# Patient Record
Sex: MALE | Race: WHITE | HISPANIC OR LATINO | Employment: FULL TIME | URBAN - METROPOLITAN AREA
[De-identification: names, ages, dates, MRNs, and addresses within clinical notes are randomized per-mention and may not be internally consistent; named-entity substitution may affect disease eponyms.]

---

## 2018-07-18 ENCOUNTER — APPOINTMENT (OUTPATIENT)
Dept: GENERAL RADIOLOGY | Facility: HOSPITAL | Age: 53
End: 2018-07-18

## 2018-07-18 PROCEDURE — 73610 X-RAY EXAM OF ANKLE: CPT | Performed by: GENERAL PRACTICE

## 2018-07-18 PROCEDURE — 73590 X-RAY EXAM OF LOWER LEG: CPT | Performed by: GENERAL PRACTICE

## 2021-06-02 ENCOUNTER — TELEPHONE (OUTPATIENT)
Dept: INTERNAL MEDICINE | Facility: CLINIC | Age: 56
End: 2021-06-02

## 2021-06-02 DIAGNOSIS — Z87.39 HISTORY OF GOUT: ICD-10-CM

## 2021-06-02 DIAGNOSIS — E55.9 VITAMIN D DEFICIENCY: ICD-10-CM

## 2021-06-02 DIAGNOSIS — Z51.81 THERAPEUTIC DRUG MONITORING: ICD-10-CM

## 2021-06-02 DIAGNOSIS — I10 BENIGN ESSENTIAL HYPERTENSION: Primary | ICD-10-CM

## 2021-06-02 DIAGNOSIS — E78.5 DYSLIPIDEMIA: ICD-10-CM

## 2021-06-02 DIAGNOSIS — Z00.00 ROUTINE PHYSICAL EXAMINATION: ICD-10-CM

## 2021-06-02 DIAGNOSIS — I10 BENIGN ESSENTIAL HYPERTENSION: ICD-10-CM

## 2021-06-04 ENCOUNTER — LAB (OUTPATIENT)
Dept: LAB | Facility: HOSPITAL | Age: 56
End: 2021-06-04

## 2021-06-04 LAB
25(OH)D3 SERPL-MCNC: 24.5 NG/ML (ref 30–100)
ALBUMIN SERPL-MCNC: 4.5 G/DL (ref 3.5–5.2)
ALBUMIN/GLOB SERPL: 1.7 G/DL
ALP SERPL-CCNC: 98 U/L (ref 39–117)
ALT SERPL W P-5'-P-CCNC: 26 U/L (ref 1–41)
ANION GAP SERPL CALCULATED.3IONS-SCNC: 11.2 MMOL/L (ref 5–15)
AST SERPL-CCNC: 28 U/L (ref 1–40)
BILIRUB SERPL-MCNC: 0.5 MG/DL (ref 0–1.2)
BILIRUB UR QL STRIP: NEGATIVE
BUN SERPL-MCNC: 17 MG/DL (ref 6–20)
BUN/CREAT SERPL: 21.8 (ref 7–25)
CALCIUM SPEC-SCNC: 9.5 MG/DL (ref 8.6–10.5)
CHLORIDE SERPL-SCNC: 104 MMOL/L (ref 98–107)
CLARITY UR: CLEAR
CO2 SERPL-SCNC: 24.8 MMOL/L (ref 22–29)
COLOR UR: YELLOW
CREAT SERPL-MCNC: 0.78 MG/DL (ref 0.76–1.27)
DEPRECATED RDW RBC AUTO: 49.1 FL (ref 37–54)
ERYTHROCYTE [DISTWIDTH] IN BLOOD BY AUTOMATED COUNT: 14.3 % (ref 12.3–15.4)
GFR SERPL CREATININE-BSD FRML MDRD: 102 ML/MIN/1.73
GLOBULIN UR ELPH-MCNC: 2.6 GM/DL
GLUCOSE SERPL-MCNC: 103 MG/DL (ref 65–99)
GLUCOSE UR STRIP-MCNC: NEGATIVE MG/DL
HCT VFR BLD AUTO: 43.9 % (ref 37.5–51)
HGB BLD-MCNC: 14.7 G/DL (ref 13–17.7)
HGB UR QL STRIP.AUTO: NEGATIVE
KETONES UR QL STRIP: NEGATIVE
LEUKOCYTE ESTERASE UR QL STRIP.AUTO: NEGATIVE
MCH RBC QN AUTO: 31.3 PG (ref 26.6–33)
MCHC RBC AUTO-ENTMCNC: 33.5 G/DL (ref 31.5–35.7)
MCV RBC AUTO: 93.4 FL (ref 79–97)
NITRITE UR QL STRIP: NEGATIVE
PH UR STRIP.AUTO: 5.5 [PH] (ref 5–8)
PLATELET # BLD AUTO: 273 10*3/MM3 (ref 140–450)
PMV BLD AUTO: 10.5 FL (ref 6–12)
POTASSIUM SERPL-SCNC: 4.6 MMOL/L (ref 3.5–5.2)
PROT SERPL-MCNC: 7.1 G/DL (ref 6–8.5)
PROT UR QL STRIP: NEGATIVE
PSA SERPL-MCNC: 2.98 NG/ML (ref 0–4)
RBC # BLD AUTO: 4.7 10*6/MM3 (ref 4.14–5.8)
SODIUM SERPL-SCNC: 140 MMOL/L (ref 136–145)
SP GR UR STRIP: 1.02 (ref 1–1.03)
T3FREE SERPL-MCNC: 3.03 PG/ML (ref 2–4.4)
T4 FREE SERPL-MCNC: 1.26 NG/DL (ref 0.93–1.7)
TSH SERPL DL<=0.05 MIU/L-ACNC: 3.99 UIU/ML (ref 0.27–4.2)
URATE SERPL-MCNC: 8.8 MG/DL (ref 3.4–7)
UROBILINOGEN UR QL STRIP: NORMAL
WBC # BLD AUTO: 7.36 10*3/MM3 (ref 3.4–10.8)

## 2021-06-04 PROCEDURE — 84550 ASSAY OF BLOOD/URIC ACID: CPT | Performed by: INTERNAL MEDICINE

## 2021-06-04 PROCEDURE — 81003 URINALYSIS AUTO W/O SCOPE: CPT | Performed by: INTERNAL MEDICINE

## 2021-06-04 PROCEDURE — 85027 COMPLETE CBC AUTOMATED: CPT | Performed by: INTERNAL MEDICINE

## 2021-06-04 PROCEDURE — 36415 COLL VENOUS BLD VENIPUNCTURE: CPT

## 2021-06-04 PROCEDURE — 84153 ASSAY OF PSA TOTAL: CPT | Performed by: INTERNAL MEDICINE

## 2021-06-04 PROCEDURE — 84481 FREE ASSAY (FT-3): CPT | Performed by: INTERNAL MEDICINE

## 2021-06-04 PROCEDURE — 84439 ASSAY OF FREE THYROXINE: CPT | Performed by: INTERNAL MEDICINE

## 2021-06-04 PROCEDURE — 82306 VITAMIN D 25 HYDROXY: CPT | Performed by: INTERNAL MEDICINE

## 2021-06-04 PROCEDURE — 83704 LIPOPROTEIN BLD QUAN PART: CPT | Performed by: INTERNAL MEDICINE

## 2021-06-04 PROCEDURE — 84443 ASSAY THYROID STIM HORMONE: CPT | Performed by: INTERNAL MEDICINE

## 2021-06-04 PROCEDURE — 80061 LIPID PANEL: CPT | Performed by: INTERNAL MEDICINE

## 2021-06-04 PROCEDURE — 80053 COMPREHEN METABOLIC PANEL: CPT | Performed by: INTERNAL MEDICINE

## 2021-06-06 LAB
CHOLEST SERPL-MCNC: 187 MG/DL (ref 100–199)
HDL SERPL-SCNC: 43.2 UMOL/L
HDLC SERPL-MCNC: 81 MG/DL
LDL SERPL QN: 21.2 NM
LDL SERPL-SCNC: 778 NMOL/L
LDL SMALL SERPL-SCNC: 194 NMOL/L
LDLC SERPL CALC-MCNC: 95 MG/DL (ref 0–99)
TRIGL SERPL-MCNC: 58 MG/DL (ref 0–149)

## 2021-06-14 ENCOUNTER — OFFICE VISIT (OUTPATIENT)
Dept: INTERNAL MEDICINE | Facility: CLINIC | Age: 56
End: 2021-06-14

## 2021-06-14 VITALS
TEMPERATURE: 98 F | OXYGEN SATURATION: 99 % | DIASTOLIC BLOOD PRESSURE: 82 MMHG | BODY MASS INDEX: 37.71 KG/M2 | HEIGHT: 70 IN | SYSTOLIC BLOOD PRESSURE: 140 MMHG | HEART RATE: 67 BPM | WEIGHT: 263.4 LBS | RESPIRATION RATE: 16 BRPM

## 2021-06-14 DIAGNOSIS — I10 BENIGN ESSENTIAL HYPERTENSION: Chronic | ICD-10-CM

## 2021-06-14 DIAGNOSIS — Z00.00 ROUTINE PHYSICAL EXAMINATION: Primary | ICD-10-CM

## 2021-06-14 DIAGNOSIS — E65 PANNUS, ABDOMINAL: Chronic | ICD-10-CM

## 2021-06-14 DIAGNOSIS — E66.01 MORBID OBESITY (HCC): ICD-10-CM

## 2021-06-14 DIAGNOSIS — Z86.010 HISTORY OF COLON POLYPS: ICD-10-CM

## 2021-06-14 DIAGNOSIS — Z51.81 THERAPEUTIC DRUG MONITORING: ICD-10-CM

## 2021-06-14 DIAGNOSIS — G47.33 OBSTRUCTIVE SLEEP APNEA: Chronic | ICD-10-CM

## 2021-06-14 DIAGNOSIS — E55.9 VITAMIN D DEFICIENCY: Chronic | ICD-10-CM

## 2021-06-14 DIAGNOSIS — Z87.39 HISTORY OF GOUT: Chronic | ICD-10-CM

## 2021-06-14 DIAGNOSIS — R49.0 CHRONIC HOARSENESS: Chronic | ICD-10-CM

## 2021-06-14 DIAGNOSIS — Z98.890 HISTORY OF GASTRIC SURGERY: Chronic | ICD-10-CM

## 2021-06-14 DIAGNOSIS — N62 GYNECOMASTIA, MALE: Chronic | ICD-10-CM

## 2021-06-14 DIAGNOSIS — K57.30 DIVERTICULOSIS OF COLON WITHOUT DIVERTICULITIS: ICD-10-CM

## 2021-06-14 DIAGNOSIS — Z11.59 NEED FOR HEPATITIS C SCREENING TEST: ICD-10-CM

## 2021-06-14 DIAGNOSIS — R73.01 IMPAIRED FASTING GLUCOSE: ICD-10-CM

## 2021-06-14 DIAGNOSIS — J38.1 VOCAL CORD POLYP: Chronic | ICD-10-CM

## 2021-06-14 PROBLEM — Z86.0100 HISTORY OF COLON POLYPS: Status: ACTIVE | Noted: 2021-06-14

## 2021-06-14 PROBLEM — Z86.0100 HISTORY OF COLON POLYPS: Chronic | Status: ACTIVE | Noted: 2021-06-14

## 2021-06-14 PROCEDURE — 99386 PREV VISIT NEW AGE 40-64: CPT | Performed by: INTERNAL MEDICINE

## 2021-06-14 RX ORDER — ALLOPURINOL 300 MG/1
TABLET ORAL
Qty: 90 TABLET | Refills: 3 | Status: SHIPPED | OUTPATIENT
Start: 2021-06-14 | End: 2022-01-17 | Stop reason: SDUPTHER

## 2021-06-14 NOTE — PROGRESS NOTES
06/14/2021    Patient Information  Reginald Mays                                                                                          [address] [facility] [address]      1965  [unfilled]  [phone] (work)    Chief Complaint:     Routine physical examination and follow-up blood work in order to monitor chronic medical issues listed in history of present illness.  Get reestablished.    History of Present Illness:    Patient with history of hypertension, chronic hoarseness and vocal cord polyps, history of Vitaliy-en-Y bariatric surgery, history of gout, male gynecomastia, morbid obesity, sleep apnea, vitamin D deficiency.  He presents today to get reestablished with me and to have his physical.  I have not seen this patient in greater than 3 years.  His past medical history reviewed and updated were necessary including health maintenance parameters.  This reveals he needs hepatitis C screening and also Shingrix vaccine.  I encouraged him to check with his insurance company regarding the Shingrix vaccine and if it is covered benefit here in the office he can come back and obtain 1.    Review of Systems   Constitutional: Negative.   HENT: Negative.    Eyes: Negative.    Cardiovascular: Negative.    Respiratory: Negative.    Endocrine: Negative.    Hematologic/Lymphatic: Negative.    Skin: Negative.    Musculoskeletal: Negative.    Gastrointestinal: Negative.    Genitourinary: Negative.    Neurological: Negative.    Psychiatric/Behavioral: Negative.    Allergic/Immunologic: Negative.        Active Problems:    Patient Active Problem List   Diagnosis   • Benign essential hypertension   • History of gastric surgery for morbid obesity bastric bypass   • History of gout   • Gynecomastia, male   • Morbid obesity (CMS/HCC)   • Obstructive sleep apnea   • Vitamin D deficiency   • Routine physical examination   • Therapeutic drug monitoring   • Impaired fasting glucose   • History of colon  polyps, 12/17/2015--tubular adenoma x1   • Diverticulosis of colon         Past Medical History:   Diagnosis Date   • Benign essential hypertension 6/19/2016 07/31/2015--patient reestablished as a patient. He has lost a tremendous amount of weight and his hypertension has resolved.   01/11/2005--initial diagnosis and treatment of hypertension. Patient has been poorly compliant.   • Chronic hoarseness 6/19/2016 07/31/2015--patient presents with a 4-5 month history of chronic hoarseness. This gets particularly worse at the end of the day to the point that he can barely talk. He has not noticed any swollen lymph nodes or masses in his neck. Suspected vocal cord polyps. ENT referral.   • Diverticulosis of colon 6/14/2021 December 17, 2015--colonoscopy revealed diverticulosis in the rectosigmoid, sigmoid, and descending colon.  There was one 5 mm polyp in the transverse colon which was removed.  Pathology returned tubular adenoma.   • Gynecomastia, male 6/19/2016 07/31/2015--patient has lost a tremendous amount of weight but has residual excess skin and breast tissue that he would like to have surgically corrected. Patient referred to plastic surgery.   • History of bacterial pneumonia 6/19/2016 08/18/2006--patient presented with chest congestion, cough, fever. Chest x-ray revealed consolidation in the right lower lobe without pleural effusion. Patient was treated with Augmentin and I added a Z-Johnson which resulted in resolution.   • History of colon polyps, 12/17/2015--tubular adenoma x1 6/14/2021 December 17, 2015--colonoscopy revealed diverticulosis in the rectosigmoid, sigmoid, and descending colon.  There was one 5 mm polyp in the transverse colon which was removed.  Pathology returned tubular adenoma.   • History of Fracture of right cuboid bone 6/19/2016 12/24/2002--patient presented to the emergency room with right foot pain and swelling. X-ray revealed a nondisplaced cuboid fracture which was  treated conservatively.   • History of gastric surgery for morbid obesity bastric bypass 6/19/2016 01/26/2005--laparoscopic Vitaliy-en-Y divided gastric bypass.   • History of gout 6/19/2016 03/02/2005--initial diagnosis and treatment of acute gouty arthritis.   • History of Lumbar burst fracture 6/19/2016 11/21/2001--patient fell off a ladder while at work and landed on his buttocks and bilateral heels. He suffered an L1 burst fracture and a right wrist fracture. There was very minimal cord impingement. Treated conservatively with an oyster shell brace. Subsequent follow-up x-ray revealed healed fracture.   • History of Periorbital cellulitis 6/19/2016 07/05/2006--patient presented with right periorbital redness and swelling and chalazion. This revealed preseptal cellulitis with no evidence of abscess. Patient admitted the hospital and responded to IV antibiotics but also required IV steroids due to severe periocular inflammation. He responded well to this. He was reevaluated by the ophthalmologist 07/14/2006 and his vision was 20/20 and his lid   • History of tetanus, diphtheria, and acellular pertussis booster vaccination (Tdap) 6/19/2016 05/29/2012--TDaP given.   • Impaired fasting glucose 6/14/2021    10/14/2021--fasting serum glucose 103.  Strongly recommend low carbohydrate diet, exercise, and weight loss.   • Morbid obesity (CMS/HCC) 6/19/2016 01/18/2005--patient was evaluated for minimally invasive bariatric surgery and this was subsequently performed. I do not have the details at the present time.   • Obstructive sleep apnea 6/19/2016 07/31/2015--patient reestablished. He has lost a significant amount of weight and has not used CPAP for years. He reports he sleeps well and has no hypersomnolence. Overnight oximetry ordered.   12/10/2002--split night sleep study with diagnostic CPAP titration. Total respiratory disturbance index 54.0 per sleep hour with oxygen desaturations to 67%.  Improvement on CPAP at a pressure of 13, with a   • Pannus, abdominal 6/19/2016 07/31/2015--patient is also tremendous amount of weight and is left with quite a large abdominal pannus that he would like to have surgically corrected. Patient referred to plastic surgery.   • Vitamin D deficiency 6/2/2021   • Vocal cord polyp 6/19/2016 08/11/2015--patient underwent a microlaryngoscopy with excision of vocal cord polyp. This was performed for chronic hoarseness. Pathology returned hyperplasia of squamous epithelium. No atypia detected.         Past Surgical History:   Procedure Laterality Date   • COLONOSCOPY  02/12/2002 02/12/2002--colonoscopy performed for bright red blood per rectum was a normal colonoscopy to the cecum with a good prep.   • LARYNGOSCOPY  08/11/2015 08/11/2015--patient underwent a microlaryngoscopy with excision of vocal cord polyp. This was performed for chronic hoarseness. Pathology returned hyperplasia of squamous epithelium. No atypia detected.   • VITALIY-EN-Y PROCEDURE  01/26/2005 01/26/2005--laparoscopic Vitaliy-en-Y divided gastric bypass.   • TONSILLECTOMY      7 years of age.         No Known Allergies        Current Outpatient Medications:   •  allopurinol (Zyloprim) 300 MG tablet, Take 1 p.o. daily to prevent gout, Disp: 90 tablet, Rfl: 3  •  vitamin D3 125 MCG (5000 UT) capsule capsule, 1 by mouth daily as directed for low vitamin D, Disp: 30 capsule, Rfl:       Family History   Problem Relation Age of Onset   • Hypertension Mother         Essential   • Atrial fibrillation Father    • Hypertension Father         Essential   • Gout Father    • Hyperlipidemia Father    • Diabetes Father         Type 2         Social History     Socioeconomic History   • Marital status:      Spouse name: Not on file   • Number of children: Not on file   • Years of education: Not on file   • Highest education level: Not on file   Tobacco Use   • Smoking status: Former Smoker     Quit date:  "     Years since quittin.4   • Smokeless tobacco: Never Used   Vaping Use   • Vaping Use: Never used   Substance and Sexual Activity   • Alcohol use: Yes     Comment: Occasionally   • Sexual activity: Yes     Partners: Female         Vitals:    21 1333   BP: 140/82   Pulse: 67   Resp: 16   Temp: 98 °F (36.7 °C)   SpO2: 99%   Weight: 119 kg (263 lb 6.4 oz)   Height: 177.8 cm (70\")        Body mass index is 37.79 kg/m².      Physical Exam:    General: Alert and oriented x 3.  No acute distress.  Obese.  Normal affect.  HEENT: Pupils equal, round, reactive to light; extraocular movements intact; sclerae nonicteric; pharynx, ear canals and TMs normal.  Neck: Without JVD, thyromegaly, bruit, or adenopathy.  Lungs: Clear to auscultation in all fields.  Heart: Regular rate and rhythm without murmur, rub, gallop, or click.  Abdomen: Soft, nontender, without hepatosplenomegaly or hernia.  Bowel sounds normal.  : Deferred.  Rectal: Deferred.  Extremities: Without clubbing, cyanosis, edema, or pulse deficit.  Neurologic: Intact without focal deficit.  Normal station and gait observed during ingress and egress from the examination room.  Skin: Without significant lesion.  Musculoskeletal: Unremarkable.    Lab/other results:    NMR is absolutely perfect.  CMP normal except glucose 103.  CBC is normal.  Urinalysis normal.  Thyroid function test normal.  PSA normal at 2.98.  Uric acid elevated at 8.8.  Vitamin D is low at 24.5.    Assessment/Plan:     Diagnosis Plan   1. Routine physical examination     2. Benign essential hypertension     3. History of gout  Uric Acid    allopurinol (Zyloprim) 300 MG tablet   4. Morbid obesity (CMS/HCC)     5. Obstructive sleep apnea  Home Sleep Study   6. Vitamin D deficiency  Vitamin D 25 Hydroxy    vitamin D3 125 MCG (5000 UT) capsule capsule   7. Vocal cord polyp     8. Chronic hoarseness     9. History of gastric surgery for morbid obesity bastric bypass     10. " Gynecomastia, male     11. Pannus, abdominal     12. Therapeutic drug monitoring     13. Impaired fasting glucose  Comprehensive Metabolic Panel    Hemoglobin A1c   14. History of colon polyps, 12/17/2015--tubular adenoma x1  Ambulatory Referral For Screening Colonoscopy   15. Diverticulosis of colon     16. Need for hepatitis C screening test  Hepatitis C Antibody     Patient presents with essentially normal annual physical except with the following issues: He has hypertension history that needs to be monitored closely and possibly treated.  It is borderline today.  He has a history of gout and his uric acid levels are quite high and I feel that he should be on allopurinol.  He still suffers from obesity and I have strongly encouraged him to lose weight.  Patient has sleep apnea which apparently improved with weight loss.  He has not been on CPAP for years.  He has extremely low vitamin D and needs to be on vitamin D supplementation.  His chronic hoarseness due to vocal cord polyp has been evaluated by ENT.  I have encouraged him to keep a follow-up.  Gynecomastia seems stable.    Plan is as follows: Start allopurinol and titrate to 300 mg/day.  Start vitamin D 5000 units/day.  Strongly recommend low carbohydrate diet, exercise, and weight loss.  I made patient aware he is at risk for developing overt diabetes.  Home sleep study ordered.  I will have him follow-up in 6 months with lab prior to reassess the situation.  Otherwise he will follow-up as needed.    Addendum: Patient had a colonoscopy back in 2016 which I was not aware of.  I was able to obtain the documentation.  This is documented under history of colon polyps.  Patient will need a repeat colonoscopy.    Procedures

## 2021-07-28 ENCOUNTER — PREP FOR SURGERY (OUTPATIENT)
Dept: OTHER | Facility: HOSPITAL | Age: 56
End: 2021-07-28

## 2021-07-28 DIAGNOSIS — Z86.010 HISTORY OF ADENOMATOUS POLYP OF COLON: Primary | ICD-10-CM

## 2021-08-02 ENCOUNTER — OFFICE VISIT (OUTPATIENT)
Dept: SLEEP MEDICINE | Facility: HOSPITAL | Age: 56
End: 2021-08-02

## 2021-08-02 VITALS
HEART RATE: 66 BPM | BODY MASS INDEX: 37.08 KG/M2 | HEIGHT: 70 IN | SYSTOLIC BLOOD PRESSURE: 136 MMHG | OXYGEN SATURATION: 98 % | DIASTOLIC BLOOD PRESSURE: 80 MMHG | WEIGHT: 259 LBS

## 2021-08-02 DIAGNOSIS — E66.9 OBESITY (BMI 30-39.9): ICD-10-CM

## 2021-08-02 DIAGNOSIS — G47.33 OBSTRUCTIVE SLEEP APNEA: Primary | Chronic | ICD-10-CM

## 2021-08-02 PROCEDURE — G0463 HOSPITAL OUTPT CLINIC VISIT: HCPCS

## 2021-08-02 NOTE — PROGRESS NOTES
[facility]  Telephone: [phone] / Fax: [phone] [place]  Telephone: [phone] / Fax: [phone] [place]    Referring Physician: Reginald Dee MD  PCP: Reginald Dee MD    Reason for consult:  sleep apnea    Reginald Mays is a 56 y.o.male  was [facility] in the [facility] today for evaluation of sleep apnea. Recent physical with PCP recommended ERWIN evaluation. Wife reports he snores at night. He broke his nose 5 times and is a mouth breather. He has chronic pain that interferes wih sleep. He wakes up every 45 minutes to change positions. He does not sleep much in supine. He gained 40 lbs in the past 5 years. Wife reports instances of observed apneas, but they do not occur often. He wakes up rested in morning. He does not take naps. He was tested for ERWIN 15 years ago. ERWIN was confirmed. He was given a CPAP machine and used it for a brief period, but then lost a lot of weight and stopped CPAP. He re-gained his weight since. He has no history of stroke/MI.     SH- runs a company,  Former smoker age [age], 3 coffee per day, no drugs.    ROS- +frequent urination      Reginald Mays  has a past medical history of Benign essential hypertension (6/19/2016), Chronic hoarseness (6/19/2016), Diverticulosis of colon (6/14/2021), Gynecomastia, male (6/19/2016), History of bacterial pneumonia (6/19/2016), History of colon polyps, 12/17/2015--tubular adenoma x1 (6/14/2021), History of Fracture of right cuboid bone (6/19/2016), History of gastric surgery for morbid obesity bastric bypass (6/19/2016), History of gout (6/19/2016), History of Lumbar burst fracture (6/19/2016), History of Periorbital cellulitis (6/19/2016), History of tetanus, diphtheria, and acellular pertussis booster vaccination (Tdap) (6/19/2016), Impaired fasting glucose (6/14/2021), Morbid obesity (CMS/HCC) (6/19/2016), Obstructive sleep apnea (6/19/2016), Pannus, abdominal (6/19/2016), Vitamin D  "deficiency (6/2/2021), and Vocal cord polyp (6/19/2016).    Current Medications:    Current Outpatient Medications:   •  allopurinol (Zyloprim) 300 MG tablet, Take 1 p.o. daily to prevent gout, Disp: 90 tablet, Rfl: 3  •  vitamin D3 125 MCG (5000 UT) capsule capsule, 1 by mouth daily as directed for low vitamin D, Disp: 30 capsule, Rfl:     I have reviewed Past Medical History, Past Surgical History, Medication List, Social History and Family History as entered in Sleep Questionnaire and EPIC.    ESS  5   Vital Signs /80   Pulse 66   Ht 177.8 cm (70\")   Wt 117 kg (259 lb)   SpO2 98%   BMI 37.16 kg/m²  Body mass index is 37.16 kg/m².    General Alert and oriented. No acute distress noted   Pharynx/Throat Class III  Mallampati airway, large tongue, no evidence of redundant lateral pharyngeal tissue. No oral lesions. No thrush. Moist mucous membranes.   Head Normocephalic. Symmetrical. Atraumatic.    Nose No septal deviation. No drainage   Chest Wall Normal shape. Symmetric expansion with respiration. No tenderness.   Neck Trachea midline, no thyromegaly or adenopathy    Lungs Clear to auscultation bilaterally. No wheezes. No rhonchi. No rales. Respirations regular, even and unlabored.   Heart Regular rhythm and normal rate. Normal S1 and S2. No murmur   Abdomen Soft, non-tender and non-distended. Normal bowel sounds. No masses.   Extremities Moves all extremities well. No edema   Psychiatric Normal mood and affect.        Impression:  1. Obstructive sleep apnea    2. Obesity (BMI 30-39.9)          Plan:  I discussed the pathophysiology of obstructive sleep apnea with the patient.  We discussed the adverse outcomes associated with untreated sleep-disordered breathing. We discussed CPAP, OMAD, and inspire treatment options. I showed him several masks. He is a mouth breather due to traumatic nose injuries, and will most likely require a full face mask. Wife and pt liked the [employer] FFM.     We discussed " treatment modalities of obstructive sleep apnea including CPAP device. Sleep study will be scheduled to establish a definitive diagnosis of sleep disorder breathing.  Weight loss will be strongly beneficial in order to reduce the severity of sleep-disordered breathing.  Patient has narrow oropharyngeal structure.  Caution during activities that require prolonged concentration is strongly advised.  After sleep study results are available, patient will be notified, and appointment will be scheduled to discuss sleep study results and treatment recommendations.        I appreciate the opportunity to participate in this patient's care.      CALLIE Loo  [facility]  Phone: [phone]      Part of this note may be an electronic transcription/translation of spoken language to printed text using the Dragon Dictation System. Some errors may exist even though the document was edited.

## 2021-08-17 ENCOUNTER — HOSPITAL ENCOUNTER (OUTPATIENT)
Dept: SLEEP MEDICINE | Facility: HOSPITAL | Age: 56
Discharge: HOME | End: 2021-08-17
Admitting: NURSE PRACTITIONER

## 2021-08-17 DIAGNOSIS — G47.33 OBSTRUCTIVE SLEEP APNEA: ICD-10-CM

## 2021-08-17 PROCEDURE — 95806 SLEEP STUDY UNATT&RESP EFFT: CPT

## 2021-08-24 ENCOUNTER — HOSPITAL ENCOUNTER (OUTPATIENT)
Facility: HOSPITAL | Age: 56
Setting detail: HOSPITAL OUTPATIENT SURGERY
Discharge: HOME | End: 2021-08-24
Attending: SURGERY | Admitting: SURGERY

## 2021-08-24 ENCOUNTER — ANESTHESIA (OUTPATIENT)
Dept: GASTROENTEROLOGY | Facility: HOSPITAL | Age: 56
End: 2021-08-24

## 2021-08-24 ENCOUNTER — ANESTHESIA EVENT (OUTPATIENT)
Dept: GASTROENTEROLOGY | Facility: HOSPITAL | Age: 56
End: 2021-08-24

## 2021-08-24 VITALS
DIASTOLIC BLOOD PRESSURE: 99 MMHG | SYSTOLIC BLOOD PRESSURE: 131 MMHG | HEART RATE: 66 BPM | WEIGHT: 241 LBS | OXYGEN SATURATION: 97 % | RESPIRATION RATE: 20 BRPM | HEIGHT: 70 IN | BODY MASS INDEX: 34.5 KG/M2

## 2021-08-24 DIAGNOSIS — G47.33 OBSTRUCTIVE SLEEP APNEA: Primary | ICD-10-CM

## 2021-08-24 DIAGNOSIS — Z91.14 POOR COMPLIANCE WITH CPAP TREATMENT: ICD-10-CM

## 2021-08-24 PROBLEM — K64.8 INTERNAL HEMORRHOIDS: Status: ACTIVE | Noted: 2021-08-24

## 2021-08-24 PROBLEM — K57.90 DIVERTICULOSIS: Status: ACTIVE | Noted: 2021-08-24

## 2021-08-24 PROCEDURE — 45378 DIAGNOSTIC COLONOSCOPY: CPT | Performed by: SURGERY

## 2021-08-24 PROCEDURE — S0260 H&P FOR SURGERY: HCPCS | Performed by: SURGERY

## 2021-08-24 PROCEDURE — 25010000002 PROPOFOL 10 MG/ML EMULSION: Performed by: NURSE ANESTHETIST, CERTIFIED REGISTERED

## 2021-08-24 RX ORDER — PROPOFOL 10 MG/ML
VIAL (ML) INTRAVENOUS AS NEEDED
Status: DISCONTINUED | OUTPATIENT
Start: 2021-08-24 | End: 2021-08-24 | Stop reason: SURG

## 2021-08-24 RX ORDER — SODIUM CHLORIDE, SODIUM LACTATE, POTASSIUM CHLORIDE, CALCIUM CHLORIDE 600; 310; 30; 20 MG/100ML; MG/100ML; MG/100ML; MG/100ML
INJECTION, SOLUTION INTRAVENOUS CONTINUOUS PRN
Status: DISCONTINUED | OUTPATIENT
Start: 2021-08-24 | End: 2021-08-24 | Stop reason: SURG

## 2021-08-24 RX ORDER — SODIUM CHLORIDE, SODIUM LACTATE, POTASSIUM CHLORIDE, CALCIUM CHLORIDE 600; 310; 30; 20 MG/100ML; MG/100ML; MG/100ML; MG/100ML
1000 INJECTION, SOLUTION INTRAVENOUS CONTINUOUS
Status: DISCONTINUED | OUTPATIENT
Start: 2021-08-24 | End: 2021-08-24 | Stop reason: HOSPADM

## 2021-08-24 RX ORDER — PROPOFOL 10 MG/ML
VIAL (ML) INTRAVENOUS CONTINUOUS PRN
Status: DISCONTINUED | OUTPATIENT
Start: 2021-08-24 | End: 2021-08-24 | Stop reason: SURG

## 2021-08-24 RX ORDER — ZOLPIDEM TARTRATE 5 MG/1
TABLET ORAL
Qty: 2 TABLET | Refills: 0 | Status: SHIPPED | OUTPATIENT
Start: 2021-08-24 | End: 2022-03-10

## 2021-08-24 RX ADMIN — PROPOFOL 120 MCG/KG/MIN: 10 INJECTION, EMULSION INTRAVENOUS at 09:31

## 2021-08-24 RX ADMIN — Medication 100 MG: at 09:31

## 2021-08-24 RX ADMIN — Medication 100 MG: at 09:43

## 2021-08-24 RX ADMIN — SODIUM CHLORIDE, POTASSIUM CHLORIDE, SODIUM LACTATE AND CALCIUM CHLORIDE: 600; 310; 30; 20 INJECTION, SOLUTION INTRAVENOUS at 09:30

## 2021-08-24 RX ADMIN — SODIUM CHLORIDE, POTASSIUM CHLORIDE, SODIUM LACTATE AND CALCIUM CHLORIDE 1000 ML: 600; 310; 30; 20 INJECTION, SOLUTION INTRAVENOUS at 09:25

## 2021-08-24 NOTE — H&P
Reason for Visit: Surveillance colonoscopy    HPI:  Patient presents for evaluation for colon cancer surveillance.  There is no family history of colon neoplasia. No family hx of gastric, ovarian, or uterine cancer.  Patient denies changes in bowel habits, diarrhea, constipation, abdominal pain, decreased caliber of stool, melena, brbpr and weight loss.  There is no personal or family history of bleeding disorder or untoward reaction to anesthesia.  Patient has had a colonoscopy 6 year(s) ago.      Past Medical History:   Diagnosis Date   • Benign essential hypertension 6/19/2016 07/31/2015--patient reestablished as a patient. He has lost a tremendous amount of weight and his hypertension has resolved.   01/11/2005--initial diagnosis and treatment of hypertension. Patient has been poorly compliant.   • Chronic hoarseness 6/19/2016 07/31/2015--patient presents with a 4-5 month history of chronic hoarseness. This gets particularly worse at the end of the day to the point that he can barely talk. He has not noticed any swollen lymph nodes or masses in his neck. Suspected vocal cord polyps. ENT referral.   • Diverticulosis of colon 6/14/2021 December 17, 2015--colonoscopy revealed diverticulosis in the rectosigmoid, sigmoid, and descending colon.  There was one 5 mm polyp in the transverse colon which was removed.  Pathology returned tubular adenoma.   • Gynecomastia, male 6/19/2016 07/31/2015--patient has lost a tremendous amount of weight but has residual excess skin and breast tissue that he would like to have surgically corrected. Patient referred to plastic surgery.   • History of bacterial pneumonia 6/19/2016 08/18/2006--patient presented with chest congestion, cough, fever. Chest x-ray revealed consolidation in the right lower lobe without pleural effusion. Patient was treated with Augmentin and I added a Z-Johnson which resulted in resolution.   • History of colon polyps, 12/17/2015--tubular adenoma  x1 6/14/2021 December 17, 2015--colonoscopy revealed diverticulosis in the rectosigmoid, sigmoid, and descending colon.  There was one 5 mm polyp in the transverse colon which was removed.  Pathology returned tubular adenoma.   • History of Fracture of right cuboid bone 6/19/2016 12/24/2002--patient presented to the emergency room with right foot pain and swelling. X-ray revealed a nondisplaced cuboid fracture which was treated conservatively.   • History of gastric surgery for morbid obesity bastric bypass 6/19/2016 01/26/2005--laparoscopic Vitaliy-en-Y divided gastric bypass.   • History of gout 6/19/2016 03/02/2005--initial diagnosis and treatment of acute gouty arthritis.   • History of Lumbar burst fracture 6/19/2016 11/21/2001--patient fell off a ladder while at work and landed on his buttocks and bilateral heels. He suffered an L1 burst fracture and a right wrist fracture. There was very minimal cord impingement. Treated conservatively with an oyster shell brace. Subsequent follow-up x-ray revealed healed fracture.   • History of Periorbital cellulitis 6/19/2016 07/05/2006--patient presented with right periorbital redness and swelling and chalazion. This revealed preseptal cellulitis with no evidence of abscess. Patient admitted the hospital and responded to IV antibiotics but also required IV steroids due to severe periocular inflammation. He responded well to this. He was reevaluated by the ophthalmologist 07/14/2006 and his vision was 20/20 and his lid   • History of tetanus, diphtheria, and acellular pertussis booster vaccination (Tdap) 6/19/2016 05/29/2012--TDaP given.   • Impaired fasting glucose 6/14/2021    10/14/2021--fasting serum glucose 103.  Strongly recommend low carbohydrate diet, exercise, and weight loss.   • Morbid obesity (CMS/HCC) 6/19/2016 01/18/2005--patient was evaluated for minimally invasive bariatric surgery and this was subsequently performed. I do not have the  details at the present time.   • Obstructive sleep apnea 6/19/2016 07/31/2015--patient reestablished. He has lost a significant amount of weight and has not used CPAP for years. He reports he sleeps well and has no hypersomnolence. Overnight oximetry ordered.   12/10/2002--split night sleep study with diagnostic CPAP titration. Total respiratory disturbance index 54.0 per sleep hour with oxygen desaturations to 67%. Improvement on CPAP at a pressure of 13, with a   • Pannus, abdominal 6/19/2016 07/31/2015--patient is also tremendous amount of weight and is left with quite a large abdominal pannus that he would like to have surgically corrected. Patient referred to plastic surgery.   • Vitamin D deficiency 6/2/2021   • Vocal cord polyp 6/19/2016 08/11/2015--patient underwent a microlaryngoscopy with excision of vocal cord polyp. This was performed for chronic hoarseness. Pathology returned hyperplasia of squamous epithelium. No atypia detected.       Past Surgical History:   Procedure Laterality Date   • COLONOSCOPY  02/12/2002 02/12/2002--colonoscopy performed for bright red blood per rectum was a normal colonoscopy to the cecum with a good prep.   • COLONOSCOPY W/ POLYPECTOMY  12/17/2015 December 17, 2015--colonoscopy revealed diverticulosis in the rectosigmoid, sigmoid, and descending colon.  There was one 5 mm polyp in the transverse colon which was removed.  Pathology returned tubular adenoma.   • LARYNGOSCOPY  08/11/2015 08/11/2015--patient underwent a microlaryngoscopy with excision of vocal cord polyp. This was performed for chronic hoarseness. Pathology returned hyperplasia of squamous epithelium. No atypia detected.   • VITALIY-EN-Y PROCEDURE  01/26/2005 01/26/2005--laparoscopic Vitaliy-en-Y divided gastric bypass.   • TONSILLECTOMY      7 years of age.       No current facility-administered medications for this encounter.    No Known Allergies    Family History   Problem Relation Age of  Onset   • Hypertension Mother         Essential   • Atrial fibrillation Father    • Hypertension Father         Essential   • Gout Father    • Hyperlipidemia Father    • Diabetes Father         Type 2       Social History     Socioeconomic History   • Marital status:      Spouse name: Not on file   • Number of children: Not on file   • Years of education: Not on file   • Highest education level: Not on file   Tobacco Use   • Smoking status: Former Smoker     Quit date:      Years since quittin.6   • Smokeless tobacco: Never Used   Vaping Use   • Vaping Use: Never used   Substance and Sexual Activity   • Alcohol use: Yes     Comment: Occasionally   • Sexual activity: Yes     Partners: Female        Review Of Systems:  A comprehensive review of systems was negative.    Objective:   Physical exam:  There were no vitals taken for this visit.    General Appearance:  awake, alert, oriented, in no acute distress and well developed, well nourished  Lungs:  Normal expansion.  Clear to auscultation.  No rales, rhonchi, or wheezing.  Heart:  Heart sounds are normal.  Regular rate and rhythm without murmur, gallop or rub.  Abdomen:  Soft, non-tender, normal bowel sounds; no bruits, organomegaly or masses.    Assessment:    Reginald Mays is a 56 y.o. male who presents for evaluation for colon cancer surveillance.    Patient has increased risk factors or warning signs or symptoms.  I reviewed current ACG guidelines for colon cancer screening:    A)  Flex sig q 5yrs with yearly fecal occult blood testing  B)  Virtual colonoscopy  C)  Colonoscopy with possible biopsy/polypectomy with IV sedation at appropriate       screening intervals    The patient has no family history of colon cancer.  He  has a personal history of colon polyp who presents for colon cancer surveillance evaluation.   I would advise a colonscopy.     The rationale for each option as well as all risks and benefits of each alternative were  discussed with the patient in detail.  The patient understands and does wish to proceed with Colonoscopy, Diagnostic        The rationale for colonoscopy with possible biopsy, possible polypectomy, with IV sedation as well as all risks, benefits, and alternatives were discussed with the patient in detail. Risks including but not limited to perforation, bleeding,need for blood transfusion or emergent surgery ,and missed neoplasm were reviewed in detail with the patient The patient demonstrates full understanding and wishes to proceed with the colonoscopy.

## 2021-08-24 NOTE — DISCHARGE INSTRUCTIONS
For the next 24 hours patient needs to be with a responsible adult.    For 24 hours DO NOT drive, operate machinery, appliances, drink alcohol, make important decisions or sign legal documents.    Start with a light or bland diet and advance to regular diet as tolerated.    Follow recommendations on procedure report provided by your doctor.    Call Dr Freeman for problems [phone]    Problems may include but not limited to: large amounts of bleeding, trouble breathing, repeated vomiting, severe unrelieved pain, fever or chills.

## 2021-08-24 NOTE — ANESTHESIA POSTPROCEDURE EVALUATION
"Patient: Reginald Mays    Procedure Summary     Date: 08/24/21 Room / Location: [facility] ENDOSCOPY 4 /  [facility]    Anesthesia Start: 0926 Anesthesia Stop: 0957    Procedure: COLONOSCOPY TO CECUM (N/A ) Diagnosis:       History of adenomatous polyp of colon      (History of adenomatous polyp of colon [Z86.010])    Surgeons: Lei Freeman MD Provider: Josue Lieberman MD    Anesthesia Type: MAC ASA Status: 3          Anesthesia Type: No value filed.    Vitals  Vitals Value Taken Time   /99 08/24/21 1015   Temp     Pulse 66 08/24/21 1015   Resp 20 08/24/21 1015   SpO2 97 % 08/24/21 1015           Post Anesthesia Care and Evaluation    Patient location during evaluation: PACU  Patient participation: complete - patient participated  Level of consciousness: awake  Pain score: 0  Pain management: adequate  Airway patency: patent  Anesthetic complications: No anesthetic complications  PONV Status: none  Cardiovascular status: acceptable  Respiratory status: acceptable  Hydration status: acceptable    Comments: /99 (BP Location: Left arm, Patient Position: Sitting)   Pulse 66   Resp 20   Ht 177.8 cm (70\")   Wt 109 kg (241 lb)   SpO2 97%   BMI 34.58 kg/m²       "

## 2021-08-24 NOTE — ANESTHESIA PREPROCEDURE EVALUATION
Anesthesia Evaluation     Patient summary reviewed and Nursing notes reviewed                Airway   Mallampati: I  TM distance: >3 FB  Neck ROM: full  No difficulty expected  Dental - normal exam     Pulmonary - normal exam   (+) a smoker Former, sleep apnea,   Cardiovascular - normal exam    (+) hypertension,       Neuro/Psych- negative ROS  GI/Hepatic/Renal/Endo    (+) obesity, morbid obesity,      Musculoskeletal (-) negative ROS    Abdominal  - normal exam    Bowel sounds: normal.   Substance History - negative use     OB/GYN negative ob/gyn ROS         Other                      Anesthesia Plan    ASA 3     MAC       Anesthetic plan, all risks, benefits, and alternatives have been provided, discussed and informed consent has been obtained with: patient.

## 2021-08-30 ENCOUNTER — TELEPHONE (OUTPATIENT)
Dept: SLEEP MEDICINE | Facility: HOSPITAL | Age: 56
End: 2021-08-30

## 2021-08-30 NOTE — TELEPHONE ENCOUNTER
LV informing patient of sleep study results and doctors recommendations for a titration. He is to call back if he has questions about sleep study results or would like to schedule that titration

## 2021-09-01 ENCOUNTER — TELEPHONE (OUTPATIENT)
Dept: SLEEP MEDICINE | Facility: HOSPITAL | Age: 56
End: 2021-09-01

## 2021-10-04 ENCOUNTER — TRANSCRIBE ORDERS (OUTPATIENT)
Dept: SLEEP MEDICINE | Facility: HOSPITAL | Age: 56
End: 2021-10-04

## 2021-10-04 DIAGNOSIS — Z01.818 OTHER SPECIFIED PRE-OPERATIVE EXAMINATION: Primary | ICD-10-CM

## 2021-10-08 ENCOUNTER — LAB (OUTPATIENT)
Dept: LAB | Facility: HOSPITAL | Age: 56
End: 2021-10-08

## 2021-10-08 LAB — SARS-COV-2 ORF1AB RESP QL NAA+PROBE: NOT DETECTED

## 2021-10-08 PROCEDURE — C9803 HOPD COVID-19 SPEC COLLECT: HCPCS | Performed by: INTERNAL MEDICINE

## 2021-10-08 PROCEDURE — U0004 COV-19 TEST NON-CDC HGH THRU: HCPCS | Performed by: INTERNAL MEDICINE

## 2021-10-11 ENCOUNTER — HOSPITAL ENCOUNTER (OUTPATIENT)
Dept: SLEEP MEDICINE | Facility: HOSPITAL | Age: 56
Discharge: HOME | End: 2021-10-11
Admitting: INTERNAL MEDICINE

## 2021-10-11 DIAGNOSIS — G47.33 OBSTRUCTIVE SLEEP APNEA: ICD-10-CM

## 2021-10-11 DIAGNOSIS — Z91.14 POOR COMPLIANCE WITH CPAP TREATMENT: ICD-10-CM

## 2021-10-11 PROCEDURE — 95811 POLYSOM 6/>YRS CPAP 4/> PARM: CPT

## 2021-10-25 ENCOUNTER — TELEPHONE (OUTPATIENT)
Dept: SLEEP MEDICINE | Facility: HOSPITAL | Age: 56
End: 2021-10-25

## 2021-10-25 NOTE — TELEPHONE ENCOUNTER
Called pt with sleep study results and faxed orders to [facility] on 10/25/2021.  F/u appt scheduled on 1/7/2022 @ 7:15 with Dr. Barnes.  CV

## 2022-01-07 ENCOUNTER — OFFICE VISIT (OUTPATIENT)
Dept: SLEEP MEDICINE | Facility: HOSPITAL | Age: 57
End: 2022-01-07

## 2022-01-07 VITALS
OXYGEN SATURATION: 98 % | WEIGHT: 264 LBS | DIASTOLIC BLOOD PRESSURE: 96 MMHG | HEART RATE: 65 BPM | BODY MASS INDEX: 37.8 KG/M2 | SYSTOLIC BLOOD PRESSURE: 150 MMHG | HEIGHT: 70 IN

## 2022-01-07 DIAGNOSIS — E66.9 OBESITY (BMI 30-39.9): ICD-10-CM

## 2022-01-07 DIAGNOSIS — G47.33 OBSTRUCTIVE SLEEP APNEA: Primary | ICD-10-CM

## 2022-01-07 PROCEDURE — G0463 HOSPITAL OUTPT CLINIC VISIT: HCPCS

## 2022-01-07 NOTE — PROGRESS NOTES
[facility] SLEEP MEDICINE  [address]  3RD FLOOR  [facility] [address]  [phone]    PCP: Reginald Dee MD    Reason for visit:  Sleep disorders: ERWIN    Reginald is a 56 y.o.male who was seen in the Sleep Disorders Center today. He was setup with CPAP in Dec 21, but unable to use due to mask leaks. He sleeps from 12mn to 7am. Using nasal mask. Had recent bronchitis. The pressure feels OK on the machine. He feels better with use of CPAP.  [place] Sleepiness Scale is 4. Caffeine 4 per day. Alcohol 12 per week.    Reginald  reports that he quit smoking about 27 years ago. He has never used smokeless tobacco.    Pertinent Positive Review of Systems of cough  Rest of Review of Systems was negative as recorded in Sleep Questionnaire.    Patient  has a past medical history of Benign essential hypertension (6/19/2016), Chronic hoarseness (6/19/2016), Diverticulosis of colon (6/14/2021), Gynecomastia, male (6/19/2016), History of bacterial pneumonia (6/19/2016), History of colon polyps, 12/17/2015--tubular adenoma x1 (6/14/2021), History of Fracture of right cuboid bone (6/19/2016), History of gastric surgery for morbid obesity bastric bypass (6/19/2016), History of gout (6/19/2016), History of Lumbar burst fracture (6/19/2016), History of Periorbital cellulitis (6/19/2016), History of tetanus, diphtheria, and acellular pertussis booster vaccination (Tdap) (6/19/2016), Impaired fasting glucose (6/14/2021), Morbid obesity (CMS/HCC) (6/19/2016), Obstructive sleep apnea (6/19/2016), Pannus, abdominal (6/19/2016), Vitamin D deficiency (6/2/2021), and Vocal cord polyp (6/19/2016).     Current Medications:    Current Outpatient Medications:   •  allopurinol (Zyloprim) 300 MG tablet, Take 1 p.o. daily to prevent gout, Disp: 90 tablet, Rfl: 3  •  vitamin D3 125 MCG (5000 UT) capsule capsule, 1 by mouth daily as directed for low vitamin D, Disp: 30 capsule, Rfl:   •  zolpidem (Ambien) 5 MG tablet, Bring to  "sleep lab DO NOT use at home. PLEASE take if not asleep within 30 mins of start of study., Disp: 2 tablet, Rfl: 0   also entered in Sleep Questionnaire         Vital Signs: /96   Pulse 65   Ht 177.8 cm (70\")   Wt 120 kg (264 lb)   SpO2 98%   BMI 37.88 kg/m²     Body mass index is 37.88 kg/m².       Tongue: Large       Dentition: good       Pharynx: Posterior pharyngeal pillars are wide   Mallampatti: III (soft and hard palate and base of uvula visible)        General: Alert. Cooperative. Well developed. No acute distress.             Head:  Normocephalic. Symmetrical. Atraumatic.              Nose: No septal deviation. No drainage.          Throat: No oral lesions. No thrush. Moist mucous membranes.    Chest Wall:  Normal shape. Symmetric expansion with respiration. No tenderness.             Neck:  Trachea midline.           Lungs:  Clear to auscultation bilaterally. No wheezes. No rhonchi. No rales. Respirations regular, even and unlabored.            Heart:  Regular rhythm and normal rate. Normal S1 and S2. No murmur.     Abdomen:  Soft, non-tender and non-distended. Normal bowel sounds. No masses.  Extremities:  Moves all extremities well. No edema.    Psychiatric: Normal mood and affect.    Diagnostic data available to date is as below and was reviewed on current visit:  · 8/18/21  The patient tolerated the home sleep testing with monitoring time of 412 minutes. The data obtained make this a technically adequate study. The apnea hypopneas index(AHI) was 27.6 per sleep hour.  The AHI during supine position was 25.8 per sleep hour. Mean heart rate of 59 BPM.  Snoring was noted 32.8% of sleep time. Lowest oxygen saturation during the study was 54%. Saturation below 89% was noted for 28.1 mins.   · 10/11/21  Overnight titration study from 10:31 PM to 4:45 AM.  Sleep efficiency 87.9% with 5.33 hours total sleep time.  Sleep distribution shows somewhat reduced REM sleep at 9.5%.  Apnea hypopnea reduced.  " During 28 minutes of supine sleep however a elevated AHI of 25 was still noted.  Oxygen saturation remained above 88%.  Arousal index 15.6 events an hour.  PLM index 25.5.  Patient at 2.3% times snoring.  CPAP increased from 7 to 12 cm water pressure.  Maximum sleep at 9 cm water pressure with 21 minutes of REM sleep.  Patient achieved minimal REM sleep in supine position.  There are indications for need of higher pressures in supine REM stage.    Most current available usage data reviewed on 01/07/2022:  · Patient only received device in December.  So far compliance is suboptimal and usage is maximum 3 hours 40 minutes during 1 night.  This does bring his AHI down to 4.3 and average pressure is 13 cm.    DME Company: [employer]    No orders of the defined types were placed in this encounter.         [place] [facility] [place]:    full face mask      Description Replacement    Nasal PILLOWS      A 7034 Nasal Pillows  every 3 mth    A 7033 Repl Nasal Pillows  2 per mth    Nasal MASK/CUSHION      A 7034 Nasal Mask/Cushion  every 3 mth    A 7032 Repl Nasal Mask/Cushion  2 per mth    Full Face MASK     x A 7030 Full Face Mask  every 3 mth   x A 7031 Repl Face Mask  1 per mth      A 4604 Heated Tubing  every 3 mth    A 7037 Standard Tubing  every 3 mth   x A 7035 Headgear  every 3 mth   x A 7046 Repl Humidifier Chamber  every 6 yrs   x A 7038 Disposable Filters  2 per mth   x A 7039 Non-disposable Filter  every 6 mth   x A 7036 Chin Strap  every 6 mth           Impression:  1. Obstructive sleep apnea    2. Obesity (BMI 30-39.9)        Plan:  Reginald is getting used to the pressure on the machine.  His mask fit appears to be suboptimal.  He does however like the mask he received in the sleep center here.  Asked to contact [facility] for adjusting the straps and possibly changing the size of the mask.  I emphasized importance of using at least 4 hours every night for insurance purposes.  We reviewed  his sleep study results and the cardiovascular health consequences of untreated sleep apnea.  He will try to use the CPAP machine more consistently.    I reiterated the importance of effective treatment of obstructive sleep apnea with PAP machine.  Cardiovascular health risks of untreated sleep apnea were again reviewed.  Patient was asked to remain cautious if there is persistent hypersomnolence. The benefit of weight loss in reducing severity of obstructive sleep apnea was discussed.  Patient would benefit from adhering to a strict diet to achieve ideal BMI.     Change of PAP supplies regularly is important for effective use.  Change of cushion on the mask or plugs on nasal pillows along with disposable filters once every month and change of mask frame, tubing, headgear and Velcro straps every 6 months at the minimum was reiterated.    Patient will follow up in this clinic in 2 months APRN    Thank you for allowing me to participate in your patient's care.    Electronically signed by Darrell Barnes MD, 01/07/22, 8:23 AM EST.    Part of this note may be an electronic transcription/translation of spoken language to printed text using the Dragon Dictation System.

## 2022-01-17 ENCOUNTER — TELEPHONE (OUTPATIENT)
Dept: INTERNAL MEDICINE | Facility: CLINIC | Age: 57
End: 2022-01-17

## 2022-01-17 DIAGNOSIS — R73.01 IMPAIRED FASTING GLUCOSE: ICD-10-CM

## 2022-01-17 DIAGNOSIS — Z87.39 HISTORY OF GOUT: Chronic | ICD-10-CM

## 2022-01-17 DIAGNOSIS — Z11.59 NEED FOR HEPATITIS C SCREENING TEST: ICD-10-CM

## 2022-01-17 DIAGNOSIS — E55.9 VITAMIN D DEFICIENCY: Chronic | ICD-10-CM

## 2022-01-17 RX ORDER — ALLOPURINOL 300 MG/1
TABLET ORAL
Qty: 90 TABLET | Refills: 0 | Status: SHIPPED | OUTPATIENT
Start: 2022-01-17 | End: 2022-07-05

## 2022-01-17 NOTE — TELEPHONE ENCOUNTER
Caller: Reginald Mays    Relationship: Self    Best call back number:     What orders are you requesting (i.e. lab or imaging): LAB    In what timeframe would the patient need to come in:     Where will you receive your lab/imaging services: OFFICE LAB    Additional notes: PATIENT WANTS ORDERS PUT IN SO THAT HE CAN HAVE A LAB BEFORE HIS PHYSICAL

## 2022-01-17 NOTE — TELEPHONE ENCOUNTER
Caller: Reginald Mays    Relationship: Self    Best call back number: [phone]    Requested Prescriptions:   Requested Prescriptions     Pending Prescriptions Disp Refills   • allopurinol (Zyloprim) 300 MG tablet 90 tablet 3     Sig: Take 1 p.o. daily to prevent gout        Pharmacy where request should be sent:  [facility]  [address] [place] [address]  [phone]    Additional details provided by patient:     Does the patient have less than a 3 day supply:  [] Yes  [x] No    Shireen Anderson Rep   01/17/22 10:45 EST

## 2022-02-11 ENCOUNTER — LAB (OUTPATIENT)
Dept: LAB | Facility: HOSPITAL | Age: 57
End: 2022-02-11

## 2022-02-11 LAB
25(OH)D3 SERPL-MCNC: 16.3 NG/ML (ref 30–100)
ALBUMIN SERPL-MCNC: 4.4 G/DL (ref 3.5–5.2)
ALBUMIN/GLOB SERPL: 2.1 G/DL
ALP SERPL-CCNC: 104 U/L (ref 39–117)
ALT SERPL W P-5'-P-CCNC: 32 U/L (ref 1–41)
ANION GAP SERPL CALCULATED.3IONS-SCNC: 11 MMOL/L (ref 5–15)
AST SERPL-CCNC: 27 U/L (ref 1–40)
BILIRUB SERPL-MCNC: 0.5 MG/DL (ref 0–1.2)
BUN SERPL-MCNC: 14 MG/DL (ref 6–20)
BUN/CREAT SERPL: 15.6 (ref 7–25)
CALCIUM SPEC-SCNC: 9 MG/DL (ref 8.6–10.5)
CHLORIDE SERPL-SCNC: 102 MMOL/L (ref 98–107)
CO2 SERPL-SCNC: 27 MMOL/L (ref 22–29)
CREAT SERPL-MCNC: 0.9 MG/DL (ref 0.76–1.27)
GFR SERPL CREATININE-BSD FRML MDRD: 87 ML/MIN/1.73
GLOBULIN UR ELPH-MCNC: 2.1 GM/DL
GLUCOSE SERPL-MCNC: 94 MG/DL (ref 65–99)
HBA1C MFR BLD: 5.5 % (ref 4.8–5.6)
HCV AB SER DONR QL: NORMAL
POTASSIUM SERPL-SCNC: 4.4 MMOL/L (ref 3.5–5.2)
PROT SERPL-MCNC: 6.5 G/DL (ref 6–8.5)
SODIUM SERPL-SCNC: 140 MMOL/L (ref 136–145)
URATE SERPL-MCNC: 5.6 MG/DL (ref 3.4–7)

## 2022-02-11 PROCEDURE — 84550 ASSAY OF BLOOD/URIC ACID: CPT | Performed by: INTERNAL MEDICINE

## 2022-02-11 PROCEDURE — 36415 COLL VENOUS BLD VENIPUNCTURE: CPT

## 2022-02-11 PROCEDURE — 82306 VITAMIN D 25 HYDROXY: CPT | Performed by: INTERNAL MEDICINE

## 2022-02-11 PROCEDURE — 83036 HEMOGLOBIN GLYCOSYLATED A1C: CPT | Performed by: INTERNAL MEDICINE

## 2022-02-11 PROCEDURE — 86803 HEPATITIS C AB TEST: CPT | Performed by: INTERNAL MEDICINE

## 2022-02-11 PROCEDURE — 80053 COMPREHEN METABOLIC PANEL: CPT | Performed by: INTERNAL MEDICINE

## 2022-03-07 ENCOUNTER — OFFICE VISIT (OUTPATIENT)
Dept: SLEEP MEDICINE | Facility: HOSPITAL | Age: 57
End: 2022-03-07

## 2022-03-07 VITALS
HEART RATE: 66 BPM | HEIGHT: 70 IN | OXYGEN SATURATION: 98 % | BODY MASS INDEX: 38.65 KG/M2 | SYSTOLIC BLOOD PRESSURE: 158 MMHG | WEIGHT: 270 LBS | DIASTOLIC BLOOD PRESSURE: 88 MMHG

## 2022-03-07 DIAGNOSIS — E66.9 OBESITY (BMI 30-39.9): ICD-10-CM

## 2022-03-07 DIAGNOSIS — G47.33 OBSTRUCTIVE SLEEP APNEA: Primary | ICD-10-CM

## 2022-03-07 NOTE — PROGRESS NOTES
[facility]  Telephone: [phone] / Fax: [phone] [place]  Telephone: [phone] / Fax: [phone] [place] Little    PCP: Reginald Dee MD    Reason for visit: ERWIN f/u    Reginald Mays is a 56 y.o.male  was last seen at [facility] sleep lab in January 2022. He has adjusted to the machine/mask. He is getting a better mask seal at present. He reports some air leak into the eyes.  He goes to bed at 11pm-8am. He has been waking up with a dry mouth at times. Air is escaping at times. He is getting used to the machine. His sleep schedule is 11pm-8am. ESS is 6.    SH- no tobacco, 8-10 alc per week, 3 cups of caffeine per day.    ROS-negative.    DME [employer]     Current Medications:    Current Outpatient Medications:   •  allopurinol (Zyloprim) 300 MG tablet, Take 1 p.o. daily to prevent gout, Disp: 90 tablet, Rfl: 0  •  vitamin D3 125 MCG (5000 UT) capsule capsule, 1 by mouth daily as directed for low vitamin D, Disp: 30 capsule, Rfl:   •  zolpidem (Ambien) 5 MG tablet, Bring to sleep lab DO NOT use at home. PLEASE take if not asleep within 30 mins of start of study., Disp: 2 tablet, Rfl: 0   also entered in Sleep Questionnaire    Patient  has a past medical history of Benign essential hypertension (6/19/2016), Chronic hoarseness (6/19/2016), Diverticulosis of colon (6/14/2021), Gynecomastia, male (6/19/2016), History of bacterial pneumonia (6/19/2016), History of colon polyps, 12/17/2015--tubular adenoma x1 (6/14/2021), History of Fracture of right cuboid bone (6/19/2016), History of gastric surgery for morbid obesity bastric bypass (6/19/2016), History of gout (6/19/2016), History of Lumbar burst fracture (6/19/2016), History of Periorbital cellulitis (6/19/2016), History of tetanus, diphtheria, and acellular pertussis booster vaccination (Tdap) (6/19/2016), Impaired fasting glucose (6/14/2021), Morbid obesity (CMS/HCC) (6/19/2016), Obstructive sleep apnea (6/19/2016), Pannus,  "abdominal (6/19/2016), Vitamin D deficiency (6/2/2021), and Vocal cord polyp (6/19/2016).    I have reviewed the Past Medical History, Past Surgical History, Social History and Family History.    Vital Signs /88   Pulse 66   Ht 177.8 cm (70\")   Wt 122 kg (270 lb)   SpO2 98%   BMI 38.74 kg/m²  Body mass index is 38.74 kg/m².    General Alert and oriented. No acute distress noted   Pharynx/Throat Class   Mallampati airway, large tongue, no evidence of redundant lateral pharyngeal tissue. No oral lesions. No thrush. Moist mucous membranes.   Head Normocephalic. Symmetrical. Atraumatic.    Nose No septal deviation. No drainage   Chest Wall Normal shape. Symmetric expansion with respiration. No tenderness.   Neck Trachea midline, no thyromegaly or adenopathy    Lungs Clear to auscultation bilaterally. No wheezes. No rhonchi. No rales. Respirations regular, even and unlabored.   Heart Regular rhythm and normal rate. Normal S1 and S2. No murmur   Abdomen Soft, non-tender and non-distended. Normal bowel sounds. No masses.   Extremities Moves all extremities well. No edema   Psychiatric Normal mood and affect.     Testing:  Download 12/4/21-3/3/22- 60% use with average nightly use of 5 hours and 43 minutes on auto CPAP 7-15 cm H2O, AHI 3.1, leak of 16.7 L/min.    Study-Results:  Overnight titration study from 10:31 PM to 4:45 AM.  Sleep efficiency 87.9% with 5.33 hours total sleep time.  Sleep distribution shows somewhat reduced REM sleep at 9.5%.  Apnea hypopnea reduced.  During 28 minutes of supine sleep however a elevated AHI of 25 was still noted.  Oxygen saturation remained above 88%.  Arousal index 15.6 events an hour.  PLM index 25.5.  Patient at 2.3% times snoring.  CPAP increased from 7 to 12 cm water pressure. Maximum sleep at 9 cm water pressure with 21 minutes of REM sleep.  Patient achieved minimal REM sleep in supine position.  There are indications for need of higher pressures in supine REM " stage.    Impression:  1. Obstructive sleep apnea    2. Obesity (BMI 30-39.9)          Plan:  Adjust humidity under climate control-pt is to try different combinations of humidity and tube temperature until he finds the levels he likes.  He is happy with his current mask(Sho mask). Continue to work on increased compliance. I reviewed original sleep study and download report with patient. Weight loss will be strongly beneficial to reduce the severity of ERWIN and improve overall wellbeing.      Thank you for allowing me to participate in your patient's care.      CALLIE Loo  [facility]  Phone: [phone]      Part of this note may be an electronic transcription/translation of spoken language to printed text using the Dragon Dictation System.

## 2022-03-10 ENCOUNTER — OFFICE VISIT (OUTPATIENT)
Dept: INTERNAL MEDICINE | Facility: CLINIC | Age: 57
End: 2022-03-10

## 2022-03-10 VITALS
OXYGEN SATURATION: 99 % | DIASTOLIC BLOOD PRESSURE: 76 MMHG | HEART RATE: 77 BPM | HEIGHT: 70 IN | RESPIRATION RATE: 18 BRPM | BODY MASS INDEX: 37.22 KG/M2 | SYSTOLIC BLOOD PRESSURE: 138 MMHG | WEIGHT: 260 LBS

## 2022-03-10 DIAGNOSIS — R73.01 IMPAIRED FASTING GLUCOSE: Primary | Chronic | ICD-10-CM

## 2022-03-10 DIAGNOSIS — I10 BENIGN ESSENTIAL HYPERTENSION: Chronic | ICD-10-CM

## 2022-03-10 DIAGNOSIS — Z51.81 THERAPEUTIC DRUG MONITORING: ICD-10-CM

## 2022-03-10 DIAGNOSIS — G47.33 OBSTRUCTIVE SLEEP APNEA: Chronic | ICD-10-CM

## 2022-03-10 DIAGNOSIS — Z00.00 ROUTINE PHYSICAL EXAMINATION: ICD-10-CM

## 2022-03-10 DIAGNOSIS — Z87.39 HISTORY OF GOUT: Chronic | ICD-10-CM

## 2022-03-10 DIAGNOSIS — E55.9 VITAMIN D DEFICIENCY: Chronic | ICD-10-CM

## 2022-03-10 DIAGNOSIS — E66.01 MORBID OBESITY: Chronic | ICD-10-CM

## 2022-03-10 PROBLEM — K64.8 INTERNAL HEMORRHOIDS: Status: RESOLVED | Noted: 2021-08-24 | Resolved: 2022-03-10

## 2022-03-10 PROCEDURE — 99214 OFFICE O/P EST MOD 30 MIN: CPT | Performed by: INTERNAL MEDICINE

## 2022-03-10 RX ORDER — INDOMETHACIN 50 MG/1
CAPSULE ORAL
Qty: 30 CAPSULE | Refills: 2 | Status: SHIPPED | OUTPATIENT
Start: 2022-03-10

## 2022-03-10 NOTE — PROGRESS NOTES
03/10/2022    Patient Information  Reginald Mays                                                                                          [address] [facility] [address]      1965  [unfilled]  [phone] (work)    Chief Complaint:     Follow-up lab work ordered monitor chronic medical issues listed in history of present illness.  Complaining of right great toe pain.    History of Present Illness:    Patient with impaired fasting glucose, history of gout, hypertension, vitamin D deficiency, sleep apnea.  He presents today to follow-up primarily concerning his blood sugar which was elevated.  I was concerned about possibility of developing overt diabetes.  Patient has been working on his diet and is lost about 10 pounds.  He has a new complaint of right great toe pain redness, swelling, and tenderness and he thinks he has an episode of gout.  We started him on allopurinol about 6 months ago.  He did not take his allopurinol just prior to the development of the symptoms.  His past history reviewed and updated were necessary including health maintenance parameters.  This reveals he is up-to-date or else accounted for with the exception of the Shingrix vaccine.  I will have patient check with his insurance regarding coverage.    Review of Systems   Constitutional: Negative.   HENT: Negative.    Eyes: Negative.    Cardiovascular: Negative.    Respiratory: Negative.    Endocrine: Negative.    Hematologic/Lymphatic: Negative.    Skin: Negative.    Musculoskeletal: Negative.         Right great toe pain, redness, swelling and tenderness   Gastrointestinal: Negative.    Genitourinary: Negative.    Neurological: Negative.    Psychiatric/Behavioral: Negative.    Allergic/Immunologic: Negative.        Active Problems:    Patient Active Problem List   Diagnosis   • Benign essential hypertension   • History of gastric surgery for morbid obesity bastric bypass   • History of gout   • Gynecomastia,  male   • Morbid obesity (HCC)   • Obstructive sleep apnea   • Vitamin D deficiency   • Routine physical examination   • Therapeutic drug monitoring   • Impaired fasting glucose   • History of colon polyps, 12/17/2015--tubular adenoma x1   • Diverticulosis of colon         Past Medical History:   Diagnosis Date   • Benign essential hypertension 6/19/2016 07/31/2015--patient reestablished as a patient. He has lost a tremendous amount of weight and his hypertension has resolved.   01/11/2005--initial diagnosis and treatment of hypertension. Patient has been poorly compliant.   • Chronic hoarseness 6/19/2016 07/31/2015--patient presents with a 4-5 month history of chronic hoarseness. This gets particularly worse at the end of the day to the point that he can barely talk. He has not noticed any swollen lymph nodes or masses in his neck. Suspected vocal cord polyps. ENT referral.   • Diverticulosis of colon 6/14/2021 December 17, 2015--colonoscopy revealed diverticulosis in the rectosigmoid, sigmoid, and descending colon.  There was one 5 mm polyp in the transverse colon which was removed.  Pathology returned tubular adenoma.   • Gynecomastia, male 6/19/2016 07/31/2015--patient has lost a tremendous amount of weight but has residual excess skin and breast tissue that he would like to have surgically corrected. Patient referred to plastic surgery.   • History of bacterial pneumonia 6/19/2016 08/18/2006--patient presented with chest congestion, cough, fever. Chest x-ray revealed consolidation in the right lower lobe without pleural effusion. Patient was treated with Augmentin and I added a Z-Johnson which resulted in resolution.   • History of colon polyps, 12/17/2015--tubular adenoma x1 6/14/2021 December 17, 2015--colonoscopy revealed diverticulosis in the rectosigmoid, sigmoid, and descending colon.  There was one 5 mm polyp in the transverse colon which was removed.  Pathology returned tubular adenoma.   •  History of Fracture of right cuboid bone 6/19/2016 12/24/2002--patient presented to the emergency room with right foot pain and swelling. X-ray revealed a nondisplaced cuboid fracture which was treated conservatively.   • History of gastric surgery for morbid obesity bastric bypass 6/19/2016 01/26/2005--laparoscopic Vitaliy-en-Y divided gastric bypass.   • History of gout 6/19/2016 03/02/2005--initial diagnosis and treatment of acute gouty arthritis.   • History of Lumbar burst fracture 6/19/2016 11/21/2001--patient fell off a ladder while at work and landed on his buttocks and bilateral heels. He suffered an L1 burst fracture and a right wrist fracture. There was very minimal cord impingement. Treated conservatively with an oyster shell brace. Subsequent follow-up x-ray revealed healed fracture.   • History of Periorbital cellulitis 6/19/2016 07/05/2006--patient presented with right periorbital redness and swelling and chalazion. This revealed preseptal cellulitis with no evidence of abscess. Patient admitted the hospital and responded to IV antibiotics but also required IV steroids due to severe periocular inflammation. He responded well to this. He was reevaluated by the ophthalmologist 07/14/2006 and his vision was 20/20 and his lid   • History of tetanus, diphtheria, and acellular pertussis booster vaccination (Tdap) 6/19/2016 05/29/2012--TDaP given.   • Impaired fasting glucose 6/14/2021    10/14/2021--fasting serum glucose 103.  Strongly recommend low carbohydrate diet, exercise, and weight loss.   • Internal hemorrhoids 8/24/2021   • Morbid obesity (HCC) 6/19/2016 01/18/2005--patient was evaluated for minimally invasive bariatric surgery and this was subsequently performed. I do not have the details at the present time.   • Obstructive sleep apnea 6/19/2016 07/31/2015--patient reestablished. He has lost a significant amount of weight and has not used CPAP for years. He reports he sleeps  well and has no hypersomnolence. Overnight oximetry ordered.   12/10/2002--split night sleep study with diagnostic CPAP titration. Total respiratory disturbance index 54.0 per sleep hour with oxygen desaturations to 67%. Improvement on CPAP at a pressure of 13, with a   • Pannus, abdominal 6/19/2016 07/31/2015--patient is also tremendous amount of weight and is left with quite a large abdominal pannus that he would like to have surgically corrected. Patient referred to plastic surgery.   • Vitamin D deficiency 6/2/2021   • Vocal cord polyp 6/19/2016 08/11/2015--patient underwent a microlaryngoscopy with excision of vocal cord polyp. This was performed for chronic hoarseness. Pathology returned hyperplasia of squamous epithelium. No atypia detected.         Past Surgical History:   Procedure Laterality Date   • COLONOSCOPY  02/12/2002 02/12/2002--colonoscopy performed for bright red blood per rectum was a normal colonoscopy to the cecum with a good prep.   • COLONOSCOPY N/A 8/24/2021    Procedure: COLONOSCOPY TO CECUM;  Surgeon: Lei Freeman MD;  Location: [facility] ENDOSCOPY;  Service: General;  Laterality: N/A;  HISTORY OF COLON POLYPS  DIVERTICULOSIS, INTERNAL HEMORRHOIDS   • COLONOSCOPY W/ POLYPECTOMY  12/17/2015 December 17, 2015--colonoscopy revealed diverticulosis in the rectosigmoid, sigmoid, and descending colon.  There was one 5 mm polyp in the transverse colon which was removed.  Pathology returned tubular adenoma.   • LARYNGOSCOPY  08/11/2015 08/11/2015--patient underwent a microlaryngoscopy with excision of vocal cord polyp. This was performed for chronic hoarseness. Pathology returned hyperplasia of squamous epithelium. No atypia detected.   • VITALIY-EN-Y PROCEDURE  01/26/2005 01/26/2005--laparoscopic Vitaliy-en-Y divided gastric bypass.   • TONSILLECTOMY      7 years of age.         No Known Allergies        Current Outpatient Medications:   •  allopurinol (Zyloprim) 300 MG  "tablet, Take 1 p.o. daily to prevent gout, Disp: 90 tablet, Rfl: 0  •  vitamin D3 125 MCG (5000 UT) capsule capsule, 1 by mouth daily as directed for low vitamin D, Disp: 30 capsule, Rfl:   •  indomethacin (INDOCIN) 50 MG capsule, Take 1 p.o. 3 times daily as needed episodes of gout, Disp: 30 capsule, Rfl: 2      Family History   Problem Relation Age of Onset   • Hypertension Mother         Essential   • Atrial fibrillation Father    • Hypertension Father         Essential   • Gout Father    • Hyperlipidemia Father    • Diabetes Father         Type 2         Social History     Socioeconomic History   • Marital status:    Tobacco Use   • Smoking status: Former Smoker     Quit date:      Years since quittin.2   • Smokeless tobacco: Never Used   Vaping Use   • Vaping Use: Never used   Substance and Sexual Activity   • Alcohol use: Yes     Comment: Occasionally   • Sexual activity: Yes     Partners: Female         Vitals:    03/10/22 0930   BP: 138/76   Pulse: 77   Resp: 18   SpO2: 99%   Weight: 118 kg (260 lb)   Height: 177.8 cm (70\")        Body mass index is 37.31 kg/m².      Physical Exam:    General: Alert and oriented x 3.  No acute distress.  Normal affect.  HEENT: Pupils equal, round, reactive to light; extraocular movements intact; sclerae nonicteric; pharynx, ear canals and TMs normal.  Neck: Without JVD, thyromegaly, bruit, or adenopathy.  Lungs: Clear to auscultation in all fields.  Heart: Regular rate and rhythm without murmur, rub, gallop, or click.  Abdomen: Soft, nontender, without hepatosplenomegaly or hernia.  Bowel sounds normal.  : Deferred.  Rectal: Deferred.  Extremities: Without clubbing, cyanosis, edema, or pulse deficit.  Neurologic: Intact without focal deficit.  Normal station and gait observed during ingress and egress from the examination room.  Skin: Without significant lesion.  Musculoskeletal: His right great toe has some soft tissue swelling and there is redness and " tenderness is obvious.    Lab/other results:    CMP is normal.  Hemoglobin A1c is normal at 5.5.  Uric acid is normal at 5.6.  Previously was 8.8.  Vitamin D remains low at 16.3.  Hepatitis C antibody is nonreactive.    Assessment/Plan:     Diagnosis Plan   1. Impaired fasting glucose  Comprehensive Metabolic Panel    Hemoglobin A1c    Urinalysis With Microscopic If Indicated (No Culture) - Urine, Clean Catch   2. History of gout  indomethacin (INDOCIN) 50 MG capsule    Uric Acid   3. Benign essential hypertension     4. Vitamin D deficiency  Vitamin D 25 Hydroxy   5. Obstructive sleep apnea     6. Morbid obesity (HCC)     7. Therapeutic drug monitoring  CBC (No Diff)    NMR LipoProfile    TSH    T4, Free    T3, Free    PSA DIAGNOSTIC   8. Routine physical examination  CBC (No Diff)    Comprehensive Metabolic Panel    Hemoglobin A1c    NMR LipoProfile    Vitamin D 25 Hydroxy    Urinalysis With Microscopic If Indicated (No Culture) - Urine, Clean Catch    TSH    T4, Free    T3, Free    PSA DIAGNOSTIC    Uric Acid     Appears that patient's impaired fasting glucose has resolved.  As mentioned he is lost some weight and I want him to continue to do so.  He still is at risk for developing diabetes but less than it was previously.  Also patient blood pressure was elevated and it looks like it is much better at the present time but still possibly a little borderline.  Continued weight loss will help that as well.  Patient has a history of gout we started him on allopurinol about 6 months ago.  He has a definite episode of acute gout involving the right toe today which is not unusual.  I explained to patient he can have episodes for a year or more after starting the allopurinol but once all of the uric acid is out of his tissues to the he should not have any problems.  His vitamin D remains low and I encouraged him to take 5000 units of vitamin D every day.    Plan is as follows: Indomethacin 50 mg p.o. 3 times daily as  needed gout.  Stay on allopurinol.  Continue with the diet efforts.  I will have him follow-up sometime in September for his annual exam.  His annual is generally due in June but I think this is a little early and will delay it a few months.        Procedures

## 2022-07-04 DIAGNOSIS — Z87.39 HISTORY OF GOUT: Chronic | ICD-10-CM

## 2022-07-05 RX ORDER — ALLOPURINOL 300 MG/1
TABLET ORAL
Qty: 90 TABLET | Refills: 2 | Status: SHIPPED | OUTPATIENT
Start: 2022-07-05 | End: 2023-03-08

## 2022-09-16 ENCOUNTER — OFFICE VISIT (OUTPATIENT)
Dept: SLEEP MEDICINE | Facility: HOSPITAL | Age: 57
End: 2022-09-16

## 2022-09-16 VITALS
OXYGEN SATURATION: 98 % | HEIGHT: 70 IN | WEIGHT: 276 LBS | SYSTOLIC BLOOD PRESSURE: 154 MMHG | HEART RATE: 65 BPM | DIASTOLIC BLOOD PRESSURE: 83 MMHG | BODY MASS INDEX: 39.51 KG/M2

## 2022-09-16 DIAGNOSIS — G47.33 OBSTRUCTIVE SLEEP APNEA: Primary | ICD-10-CM

## 2022-09-16 DIAGNOSIS — E66.9 OBESITY (BMI 30-39.9): ICD-10-CM

## 2022-09-16 PROCEDURE — G0463 HOSPITAL OUTPT CLINIC VISIT: HCPCS

## 2022-09-16 NOTE — PROGRESS NOTES
[facility]IC[facility]  [address]  [place] [address] [phone]    PCP: Reginald Dee MD    Reason for visit:  Sleep disorders: ERWIN    Reginadl is a 57 y.o.male who was seen in the Sleep Disorders Center today. He is using the mask except on weekends. He sleeps from 11pm to 11am. Using Sho ffm. Fits well and no air leaks. The humidity setting is good. He wakes up more rested with the device.  [place] Sleepiness Scale is 7. Caffeine 5 per day. Alcohol - per week.    Reginald  reports that he quit smoking about 27 years ago. He has never used smokeless tobacco.    Pertinent Positive Review of Systems of denies  Rest of Review of Systems was negative as recorded in Sleep Questionnaire.    Patient  has a past medical history of Benign essential hypertension (6/19/2016), Chronic hoarseness (6/19/2016), Diverticulosis of colon (6/14/2021), Gynecomastia, male (6/19/2016), History of bacterial pneumonia (6/19/2016), History of colon polyps, 12/17/2015--tubular adenoma x1 (6/14/2021), History of Fracture of right cuboid bone (6/19/2016), History of gastric surgery for morbid obesity bastric bypass (6/19/2016), History of gout (6/19/2016), History of Lumbar burst fracture (6/19/2016), History of Periorbital cellulitis (6/19/2016), History of tetanus, diphtheria, and acellular pertussis booster vaccination (Tdap) (6/19/2016), Impaired fasting glucose (6/14/2021), Internal hemorrhoids (8/24/2021), Morbid obesity (HCC) (6/19/2016), Obstructive sleep apnea (6/19/2016), Pannus, abdominal (6/19/2016), Vitamin D deficiency (6/2/2021), and Vocal cord polyp (6/19/2016).     Current Medications:    Current Outpatient Medications:   •  allopurinol (ZYLOPRIM) 300 MG tablet, TAKE 1 TABLET BY MOUTH DAILY FOR GOUT PREVENTION, Disp: 90 tablet, Rfl: 2  •  indomethacin (INDOCIN) 50 MG capsule, Take 1 p.o. 3 times daily as needed episodes of gout, Disp: 30 capsule, Rfl: 2  •  vitamin D3 125 MCG  "(5000 UT) capsule capsule, 1 by mouth daily as directed for low vitamin D, Disp: 30 capsule, Rfl:    also entered in Sleep Questionnaire         Vital Signs: /83   Pulse 65   Ht 177.8 cm (70\")   Wt 125 kg (276 lb)   SpO2 98%   BMI 39.60 kg/m²     Body mass index is 39.6 kg/m².       Tongue: Large       Dentition: good       Pharynx: Posterior pharyngeal pillars are wide   Mallampatti: III (soft and hard palate and base of uvula visible)        General: Alert. Cooperative. Well developed. No acute distress.             Head:  Normocephalic. Symmetrical. Atraumatic.              Nose: No septal deviation. No drainage.          Throat: No oral lesions. No thrush. Moist mucous membranes.    Chest Wall:  Normal shape. Symmetric expansion with respiration. No tenderness.             Neck:  Trachea midline.           Lungs:  Clear to auscultation bilaterally. No wheezes. No rhonchi. No rales. Respirations regular, even and unlabored.            Heart:  Regular rhythm and normal rate. Normal S1 and S2. No murmur.     Abdomen:  Soft, non-tender and non-distended. Normal bowel sounds. No masses.  Extremities:  Moves all extremities well. No edema.    Psychiatric: Normal mood and affect.    Diagnostic data available to date is as below and was reviewed on current visit:  · 8/18/21  The patient tolerated the home sleep testing with monitoring time of 412 minutes. The data obtained make this a technically adequate study. The apnea hypopneas index(AHI) was 27.6 per sleep hour.  The AHI during supine position was 25.8 per sleep hour. Mean heart rate of 59 BPM.  Snoring was noted 32.8% of sleep time. Lowest oxygen saturation during the study was 54%. Saturation below 89% was noted for 28.1 mins.   · 10/11/21  Overnight titration study from 10:31 PM to 4:45 AM.  Sleep efficiency 87.9% with 5.33 hours total sleep time.  Sleep distribution shows somewhat reduced REM sleep at 9.5%.  Apnea hypopnea reduced.  During 28 minutes " of supine sleep however a elevated AHI of 25 was still noted.  Oxygen saturation remained above 88%.  Arousal index 15.6 events an hour.  PLM index 25.5.  Patient at 2.3% times snoring.  CPAP increased from 7 to 12 cm water pressure.  Maximum sleep at 9 cm water pressure with 21 minutes of REM sleep.  Patient achieved minimal REM sleep in supine position.  There are indications for need of higher pressures in supine REM stage.    Most current available usage data reviewed on 09/16/2022:  · 52% compliance average 6 hours 23 minutes AHI 3.4 average pressure 14.4 auto CPAP 7-15    DME Company: [employer]    Prescription to [facility] for replacement supplies as below:    full face mask      Description Replacement    Nasal PILLOWS      A 7034 Nasal Pillows  every 3 mth    A 7033 Repl Nasal Pillows  2 per mth    Nasal MASK/CUSHION      A 7034 Nasal Mask/Cushion  every 3 mth    A 7032 Repl Nasal Mask/Cushion  2 per mth    Full Face MASK     x A 7030 Full Face Mask  every 3 mth   x A 7031 Repl Face Mask  1 per mth      A 4604 Heated Tubing  every 3 mth    A 7037 Standard Tubing  every 3 mth   x A 7035 Headgear  every 3 mth   x A 7046 Repl Humidifier Chamber  every 6 yrs   x A 7038 Disposable Filters  2 per mth   x A 7039 Non-disposable Filter  every 6 mth   x A 7036 Chin Strap  every 6 mth         No orders of the defined types were placed in this encounter.         Impression:  1. Obstructive sleep apnea    2. Obesity (BMI 30-39.9)        Plan:  Reginald is compliant and benefits.  He likes his mask and wakes up rested.  He was reminded to change supplies regularly.    I reiterated the importance of effective treatment of obstructive sleep apnea with PAP machine.  Cardiovascular health risks of untreated sleep apnea were again reviewed.  Patient was asked to remain cautious if there is persistent hypersomnolence. The benefit of weight loss in reducing severity of obstructive sleep apnea was discussed.  Patient would benefit from  adhering to a strict diet to achieve ideal BMI.     Change of PAP supplies regularly is important for effective use.  Change of cushion on the mask or plugs on nasal pillows along with disposable filters once every month and change of mask frame, tubing, headgear and Velcro straps every 6 months at the minimum was reiterated.    This patient is compliant with PAP machine and benefits from its use.  Apnea hypopneas index is corrected/improved.  Daytime hypersomnolence has resolved.     Patient will follow up in this clinic in 1 year APRN    Thank you for allowing me to participate in your patient's care.    Electronically signed by Darrell Barnes MD, 09/16/22, 12:20 PM EDT.    Part of this note may be an electronic transcription/translation of spoken language to printed text using the Dragon Dictation System.

## 2022-10-28 DIAGNOSIS — Z00.00 ROUTINE PHYSICAL EXAMINATION: ICD-10-CM

## 2022-10-28 DIAGNOSIS — R73.01 IMPAIRED FASTING GLUCOSE: Chronic | ICD-10-CM

## 2022-10-28 DIAGNOSIS — E55.9 VITAMIN D DEFICIENCY: Chronic | ICD-10-CM

## 2022-10-28 DIAGNOSIS — Z51.81 THERAPEUTIC DRUG MONITORING: ICD-10-CM

## 2022-10-30 LAB
25(OH)D3+25(OH)D2 SERPL-MCNC: 52.3 NG/ML (ref 30–100)
ALBUMIN SERPL-MCNC: 4.6 G/DL (ref 3.5–5.2)
ALBUMIN/GLOB SERPL: 2 G/DL
ALP SERPL-CCNC: 122 U/L (ref 39–117)
ALT SERPL-CCNC: 22 U/L (ref 1–41)
APPEARANCE UR: CLEAR
AST SERPL-CCNC: 22 U/L (ref 1–40)
BACTERIA #/AREA URNS HPF: ABNORMAL /HPF
BILIRUB SERPL-MCNC: 0.4 MG/DL (ref 0–1.2)
BILIRUB UR QL STRIP: NEGATIVE
BUN SERPL-MCNC: 17 MG/DL (ref 6–20)
BUN/CREAT SERPL: 17.5 (ref 7–25)
CALCIUM SERPL-MCNC: 9.6 MG/DL (ref 8.6–10.5)
CASTS URNS MICRO: ABNORMAL
CHLORIDE SERPL-SCNC: 103 MMOL/L (ref 98–107)
CHOLEST SERPL-MCNC: 176 MG/DL (ref 100–199)
CO2 SERPL-SCNC: 28.3 MMOL/L (ref 22–29)
COLOR UR: YELLOW
CREAT SERPL-MCNC: 0.97 MG/DL (ref 0.76–1.27)
EGFRCR SERPLBLD CKD-EPI 2021: 89.9 ML/MIN/1.73
EPI CELLS #/AREA URNS HPF: ABNORMAL /HPF
ERYTHROCYTE [DISTWIDTH] IN BLOOD BY AUTOMATED COUNT: 13.5 % (ref 12.3–15.4)
GLOBULIN SER CALC-MCNC: 2.3 GM/DL
GLUCOSE SERPL-MCNC: 102 MG/DL (ref 65–99)
GLUCOSE UR QL STRIP: NEGATIVE
HBA1C MFR BLD: 5.7 % (ref 4.8–5.6)
HCT VFR BLD AUTO: 43.9 % (ref 37.5–51)
HDL SERPL-SCNC: 42.2 UMOL/L
HDLC SERPL-MCNC: 72 MG/DL
HGB BLD-MCNC: 14.5 G/DL (ref 13–17.7)
HGB UR QL STRIP: NEGATIVE
KETONES UR QL STRIP: NEGATIVE
LDL SERPL QN: 21.6 NM
LDL SERPL-SCNC: 892 NMOL/L
LDL SMALL SERPL-SCNC: 234 NMOL/L
LDLC SERPL CALC-MCNC: 90 MG/DL (ref 0–99)
LEUKOCYTE ESTERASE UR QL STRIP: ABNORMAL
MCH RBC QN AUTO: 29.6 PG (ref 26.6–33)
MCHC RBC AUTO-ENTMCNC: 33 G/DL (ref 31.5–35.7)
MCV RBC AUTO: 89.6 FL (ref 79–97)
NITRITE UR QL STRIP: NEGATIVE
PH UR STRIP: 5.5 [PH] (ref 5–8)
PLATELET # BLD AUTO: 263 10*3/MM3 (ref 140–450)
POTASSIUM SERPL-SCNC: 4.7 MMOL/L (ref 3.5–5.2)
PROT SERPL-MCNC: 6.9 G/DL (ref 6–8.5)
PROT UR QL STRIP: NEGATIVE
PSA SERPL-MCNC: 3.27 NG/ML (ref 0–4)
RBC # BLD AUTO: 4.9 10*6/MM3 (ref 4.14–5.8)
RBC #/AREA URNS HPF: ABNORMAL /HPF
SODIUM SERPL-SCNC: 140 MMOL/L (ref 136–145)
SP GR UR STRIP: 1.02 (ref 1–1.03)
T3FREE SERPL-MCNC: 3 PG/ML (ref 2–4.4)
T4 FREE SERPL-MCNC: 1.13 NG/DL (ref 0.93–1.7)
TRIGL SERPL-MCNC: 75 MG/DL (ref 0–149)
TSH SERPL DL<=0.005 MIU/L-ACNC: 3.36 UIU/ML (ref 0.27–4.2)
UROBILINOGEN UR STRIP-MCNC: ABNORMAL MG/DL
WBC # BLD AUTO: 8.47 10*3/MM3 (ref 3.4–10.8)
WBC #/AREA URNS HPF: ABNORMAL /HPF

## 2022-11-01 ENCOUNTER — OFFICE VISIT (OUTPATIENT)
Dept: INTERNAL MEDICINE | Facility: CLINIC | Age: 57
End: 2022-11-01

## 2022-11-01 VITALS
WEIGHT: 272.6 LBS | RESPIRATION RATE: 18 BRPM | SYSTOLIC BLOOD PRESSURE: 130 MMHG | HEIGHT: 70 IN | DIASTOLIC BLOOD PRESSURE: 80 MMHG | HEART RATE: 75 BPM | BODY MASS INDEX: 39.03 KG/M2 | OXYGEN SATURATION: 99 %

## 2022-11-01 DIAGNOSIS — E88.81 METABOLIC SYNDROME: ICD-10-CM

## 2022-11-01 DIAGNOSIS — Z87.39 HISTORY OF GOUT: Chronic | ICD-10-CM

## 2022-11-01 DIAGNOSIS — Z00.00 ROUTINE PHYSICAL EXAMINATION: Primary | ICD-10-CM

## 2022-11-01 DIAGNOSIS — E66.9 NON MORBID OBESITY: Chronic | ICD-10-CM

## 2022-11-01 DIAGNOSIS — R73.01 IMPAIRED FASTING GLUCOSE: Chronic | ICD-10-CM

## 2022-11-01 DIAGNOSIS — I10 BENIGN ESSENTIAL HYPERTENSION: Chronic | ICD-10-CM

## 2022-11-01 DIAGNOSIS — G47.33 OBSTRUCTIVE SLEEP APNEA: Chronic | ICD-10-CM

## 2022-11-01 DIAGNOSIS — Z23 NEED FOR SHINGLES VACCINE: ICD-10-CM

## 2022-11-01 DIAGNOSIS — Z98.890 HISTORY OF GASTRIC SURGERY: Chronic | ICD-10-CM

## 2022-11-01 DIAGNOSIS — Z86.010 HISTORY OF COLON POLYPS: Chronic | ICD-10-CM

## 2022-11-01 DIAGNOSIS — E55.9 VITAMIN D DEFICIENCY: Chronic | ICD-10-CM

## 2022-11-01 DIAGNOSIS — Z51.81 THERAPEUTIC DRUG MONITORING: ICD-10-CM

## 2022-11-01 PROCEDURE — 90471 IMMUNIZATION ADMIN: CPT | Performed by: INTERNAL MEDICINE

## 2022-11-01 PROCEDURE — 99396 PREV VISIT EST AGE 40-64: CPT | Performed by: INTERNAL MEDICINE

## 2022-11-01 PROCEDURE — 90750 HZV VACC RECOMBINANT IM: CPT | Performed by: INTERNAL MEDICINE

## 2022-11-01 RX ORDER — TIRZEPATIDE 5 MG/.5ML
5 INJECTION, SOLUTION SUBCUTANEOUS WEEKLY
Qty: 2 ML | Refills: 2 | Status: SHIPPED | OUTPATIENT
Start: 2022-11-01 | End: 2023-01-11

## 2022-11-01 NOTE — PROGRESS NOTES
11/01/2022    Patient Information  Reginald Mays                                                                                          [address] [facility] [address]      1965  [unfilled]  [phone] (work)    Chief Complaint:     Routine physical examination follow-up blood work.  No new acute complaints.    History of Present Illness:      ROS    Active Problems:    Patient Active Problem List   Diagnosis   • Benign essential hypertension   • History of gastric surgery for morbid obesity bastric bypass   • History of gout   • Gynecomastia, male   • Non morbid obesity   • Obstructive sleep apnea   • Vitamin D deficiency   • Routine physical examination   • Therapeutic drug monitoring   • Impaired fasting glucose   • History of colon polyps, 12/17/2015--tubular adenoma x1   • Diverticulosis of colon         Past Medical History:   Diagnosis Date   • Benign essential hypertension 06/19/2016 07/31/2015--patient reestablished as a patient. He has lost a tremendous amount of weight and his hypertension has resolved.   01/11/2005--initial diagnosis and treatment of hypertension. Patient has been poorly compliant.   • Chronic hoarseness 06/19/2016 07/31/2015--patient presents with a 4-5 month history of chronic hoarseness. This gets particularly worse at the end of the day to the point that he can barely talk. He has not noticed any swollen lymph nodes or masses in his neck. Suspected vocal cord polyps. ENT referral.   • Diverticulosis of colon 06/14/2021 December 17, 2015--colonoscopy revealed diverticulosis in the rectosigmoid, sigmoid, and descending colon.  There was one 5 mm polyp in the transverse colon which was removed.  Pathology returned tubular adenoma.   • Gynecomastia, male 06/19/2016 07/31/2015--patient has lost a tremendous amount of weight but has residual excess skin and breast tissue that he would like to have surgically corrected. Patient referred to  plastic surgery.   • History of bacterial pneumonia 06/19/2016 08/18/2006--patient presented with chest congestion, cough, fever. Chest x-ray revealed consolidation in the right lower lobe without pleural effusion. Patient was treated with Augmentin and I added a Z-Johnson which resulted in resolution.   • History of colon polyps, 12/17/2015--tubular adenoma x1 06/14/2021 December 17, 2015--colonoscopy revealed diverticulosis in the rectosigmoid, sigmoid, and descending colon.  There was one 5 mm polyp in the transverse colon which was removed.  Pathology returned tubular adenoma.   • History of Fracture of right cuboid bone 06/19/2016 12/24/2002--patient presented to the emergency room with right foot pain and swelling. X-ray revealed a nondisplaced cuboid fracture which was treated conservatively.   • History of gastric surgery for morbid obesity bastric bypass 06/19/2016 01/26/2005--laparoscopic Vitaliy-en-Y divided gastric bypass.   • History of gout 06/19/2016 03/02/2005--initial diagnosis and treatment of acute gouty arthritis.   • History of Lumbar burst fracture 06/19/2016 11/21/2001--patient fell off a ladder while at work and landed on his buttocks and bilateral heels. He suffered an L1 burst fracture and a right wrist fracture. There was very minimal cord impingement. Treated conservatively with an oyster shell brace. Subsequent follow-up x-ray revealed healed fracture.   • History of Periorbital cellulitis 06/19/2016 07/05/2006--patient presented with right periorbital redness and swelling and chalazion. This revealed preseptal cellulitis with no evidence of abscess. Patient admitted the hospital and responded to IV antibiotics but also required IV steroids due to severe periocular inflammation. He responded well to this. He was reevaluated by the ophthalmologist 07/14/2006 and his vision was 20/20 and his lid   • History of tetanus, diphtheria, and acellular pertussis booster vaccination  (Tdap) 06/19/2016 05/29/2012--TDaP given.   • Impaired fasting glucose 06/14/2021    10/14/2021--fasting serum glucose 103.  Strongly recommend low carbohydrate diet, exercise, and weight loss.   • Internal hemorrhoids 08/24/2021   • Morbid obesity (HCC) 06/19/2016 01/18/2005--patient was evaluated for minimally invasive bariatric surgery and this was subsequently performed. I do not have the details at the present time.   • Obstructive sleep apnea 06/19/2016 07/31/2015--patient reestablished. He has lost a significant amount of weight and has not used CPAP for years. He reports he sleeps well and has no hypersomnolence. Overnight oximetry ordered.   12/10/2002--split night sleep study with diagnostic CPAP titration. Total respiratory disturbance index 54.0 per sleep hour with oxygen desaturations to 67%. Improvement on CPAP at a pressure of 13, with a   • Pannus, abdominal 06/19/2016 07/31/2015--patient is also tremendous amount of weight and is left with quite a large abdominal pannus that he would like to have surgically corrected. Patient referred to plastic surgery.   • Vitamin D deficiency 06/02/2021   • Vocal cord polyp 06/19/2016 08/11/2015--patient underwent a microlaryngoscopy with excision of vocal cord polyp. This was performed for chronic hoarseness. Pathology returned hyperplasia of squamous epithelium. No atypia detected.         Past Surgical History:   Procedure Laterality Date   • COLONOSCOPY  02/12/2002 02/12/2002--colonoscopy performed for bright red blood per rectum was a normal colonoscopy to the cecum with a good prep.   • COLONOSCOPY N/A 8/24/2021    Procedure: COLONOSCOPY TO CECUM;  Surgeon: Lei Freeman MD;  Location: [facility] ENDOSCOPY;  Service: General;  Laterality: N/A;  HISTORY OF COLON POLYPS  DIVERTICULOSIS, INTERNAL HEMORRHOIDS   • COLONOSCOPY W/ POLYPECTOMY  12/17/2015 December 17, 2015--colonoscopy revealed diverticulosis in the rectosigmoid,  "sigmoid, and descending colon.  There was one 5 mm polyp in the transverse colon which was removed.  Pathology returned tubular adenoma.   • LARYNGOSCOPY  2015--patient underwent a microlaryngoscopy with excision of vocal cord polyp. This was performed for chronic hoarseness. Pathology returned hyperplasia of squamous epithelium. No atypia detected.   • VITALIY-EN-Y PROCEDURE  2005--laparoscopic Vitaliy-en-Y divided gastric bypass.   • TONSILLECTOMY      7 years of age.         No Known Allergies        Current Outpatient Medications:   •  allopurinol (ZYLOPRIM) 300 MG tablet, TAKE 1 TABLET BY MOUTH DAILY FOR GOUT PREVENTION, Disp: 90 tablet, Rfl: 2  •  indomethacin (INDOCIN) 50 MG capsule, Take 1 p.o. 3 times daily as needed episodes of gout, Disp: 30 capsule, Rfl: 2  •  vitamin D3 125 MCG (5000 UT) capsule capsule, 1 by mouth daily as directed for low vitamin D, Disp: 30 capsule, Rfl:       Family History   Problem Relation Age of Onset   • Hypertension Mother         Essential   • Atrial fibrillation Father    • Hypertension Father         Essential   • Gout Father    • Hyperlipidemia Father    • Diabetes Father         Type 2         Social History     Socioeconomic History   • Marital status:    Tobacco Use   • Smoking status: Former     Types: Cigarettes     Quit date:      Years since quittin.8   • Smokeless tobacco: Never   Vaping Use   • Vaping Use: Never used   Substance and Sexual Activity   • Alcohol use: Yes     Comment: Occasionally   • Sexual activity: Yes     Partners: Female         Vitals:    22 1228   BP: 130/80   Pulse: 75   Resp: 18   SpO2: 99%   Weight: 124 kg (272 lb 9.6 oz)   Height: 177.8 cm (70\")        Body mass index is 39.11 kg/m².      Physical Exam:    General: Alert and oriented x 3.  No acute distress.  Near morbidly obese.  Normal affect.  HEENT: Pupils equal, round, reactive to light; extraocular movements intact; sclerae " nonicteric; pharynx, ear canals and TMs normal.  Neck: Without JVD, thyromegaly, bruit, or adenopathy.  Lungs: Clear to auscultation in all fields.  Heart: Regular rate and rhythm without murmur, rub, gallop, or click.  Abdomen: Soft, nontender, without hepatosplenomegaly or hernia.  Bowel sounds normal.  : Deferred.  Rectal: Deferred.  Extremities: Without clubbing, cyanosis, edema, or pulse deficit.  Neurologic: Intact without focal deficit.  Normal station and gait observed during ingress and egress from the examination room.  Skin: Without significant lesion.  Musculoskeletal: Unremarkable.    Lab/other results:    CBC is normal.  CMP normal except glucose 102.  Hemoglobin A1c 5.7.  Total cholesterol 176, triglycerides 75, LDL particle #892, HDL particle #42.2.  Vitamin D normal at 52.3.  Thyroid function tests are normal.  PSA normal at 3.27.  Urinalysis reveals 3-5 WBCs.    Assessment/Plan:     Diagnosis Plan   1. Routine physical examination        2. Impaired fasting glucose        3. Benign essential hypertension        4. History of gout        5. Obstructive sleep apnea        6. Vitamin D deficiency        7. History of colon polyps, 12/17/2015--tubular adenoma x1        8. History of gastric surgery for morbid obesity bastric bypass        9. Non morbid obesity        10. Therapeutic drug monitoring          Patient presents with essentially normal annual physical with fairly stable medical problems as noted above except he has developed metabolic syndrome and has impaired fasting glucose and continues to have near morbid obesity despite having bariatric surgery.  I think he would be an excellent candidate for medication such as Mounjaro.    Several preventative health issues discussed including review of vaccinations and recommendations, including dietary issues, exercise and weight loss.  Safe sex practices discussed.  Patient advised to wear seatbelt whenever driving and avoid texting and driving.   Also advised to look both ways before crossing the street.  Colon cancer prevention discussed and is up-to-date with colonoscopy.  Advised to avoid tobacco products and minimize alcohol consumption.    Plan is as follows: Start Mounjaro 2.5 mg subcutaneously weekly and then increase to 5 mg weekly.  Samples given.  Patient will follow-up in 6 weeks to reassess.  Risk, benefits, off label status and alternatives discussed with patient and he understands.  If he develops nausea that requires medication he just needs to contact me.  Shingrix vaccine given today.    Procedures

## 2022-12-16 DIAGNOSIS — Z51.81 THERAPEUTIC DRUG MONITORING: ICD-10-CM

## 2022-12-16 DIAGNOSIS — R73.01 IMPAIRED FASTING GLUCOSE: Chronic | ICD-10-CM

## 2022-12-16 DIAGNOSIS — I10 BENIGN ESSENTIAL HYPERTENSION: Primary | Chronic | ICD-10-CM

## 2022-12-16 DIAGNOSIS — E55.9 VITAMIN D DEFICIENCY: Chronic | ICD-10-CM

## 2023-01-11 ENCOUNTER — OFFICE VISIT (OUTPATIENT)
Dept: INTERNAL MEDICINE | Facility: CLINIC | Age: 58
End: 2023-01-11
Payer: COMMERCIAL

## 2023-01-11 VITALS
OXYGEN SATURATION: 100 % | HEART RATE: 56 BPM | HEIGHT: 70 IN | BODY MASS INDEX: 36.76 KG/M2 | DIASTOLIC BLOOD PRESSURE: 76 MMHG | SYSTOLIC BLOOD PRESSURE: 115 MMHG | RESPIRATION RATE: 16 BRPM | WEIGHT: 256.8 LBS | TEMPERATURE: 96.6 F

## 2023-01-11 DIAGNOSIS — E66.9 NON MORBID OBESITY: Chronic | ICD-10-CM

## 2023-01-11 DIAGNOSIS — R73.01 IMPAIRED FASTING GLUCOSE: Primary | Chronic | ICD-10-CM

## 2023-01-11 DIAGNOSIS — E88.81 METABOLIC SYNDROME: ICD-10-CM

## 2023-01-11 PROBLEM — E88.810 METABOLIC SYNDROME: Status: ACTIVE | Noted: 2023-01-11

## 2023-01-11 PROCEDURE — 99213 OFFICE O/P EST LOW 20 MIN: CPT | Performed by: INTERNAL MEDICINE

## 2023-01-11 RX ORDER — TIRZEPATIDE 10 MG/.5ML
10 INJECTION, SOLUTION SUBCUTANEOUS WEEKLY
Qty: 2 ML | Refills: 6 | Status: SHIPPED | OUTPATIENT
Start: 2023-01-11

## 2023-01-11 NOTE — PROGRESS NOTES
01/11/2023    Patient Information  Reginald Mays                                                                                          [address] [facility] [address]      1965  [unfilled]  [phone] (work)    Chief Complaint:     Follow-up initiation of medication for impaired fasting glucose/pre-/early diabetes, metabolic syndrome and nonmorbid obesity.    History of Present Illness:    Patient with a history of medical problems as noted above presents today for a follow-up after we initiated terzepatide and titrated up to 5mg/week.  Patient reports he is feeling much better with this medication and has actually lost nearly 15 pounds.  He is not having any side effects and certainly is agreeable to increasing the dose.  Past medical history reviewed and updated.    Review of Systems   Constitutional: Negative.   HENT: Negative.    Eyes: Negative.    Cardiovascular: Negative.    Respiratory: Negative.    Endocrine: Negative.    Hematologic/Lymphatic: Negative.    Skin: Negative.    Musculoskeletal: Negative.    Gastrointestinal: Negative.    Genitourinary: Negative.    Neurological: Negative.    Psychiatric/Behavioral: Negative.    Allergic/Immunologic: Negative.        Active Problems:    Patient Active Problem List   Diagnosis   • Benign essential hypertension   • History of gastric surgery for morbid obesity bastric bypass   • History of gout   • Gynecomastia, male   • Non morbid obesity   • Obstructive sleep apnea   • Vitamin D deficiency   • Routine physical examination   • Therapeutic drug monitoring   • Impaired fasting glucose   • History of colon polyps, 12/17/2015--tubular adenoma x1   • Diverticulosis of colon   • Metabolic syndrome         Past Medical History:   Diagnosis Date   • Benign essential hypertension 06/19/2016 07/31/2015--patient reestablished as a patient. He has lost a tremendous amount of weight and his hypertension has resolved.    01/11/2005--initial diagnosis and treatment of hypertension. Patient has been poorly compliant.   • Chronic hoarseness 06/19/2016 07/31/2015--patient presents with a 4-5 month history of chronic hoarseness. This gets particularly worse at the end of the day to the point that he can barely talk. He has not noticed any swollen lymph nodes or masses in his neck. Suspected vocal cord polyps. ENT referral.   • Diverticulosis of colon 06/14/2021 December 17, 2015--colonoscopy revealed diverticulosis in the rectosigmoid, sigmoid, and descending colon.  There was one 5 mm polyp in the transverse colon which was removed.  Pathology returned tubular adenoma.   • Gynecomastia, male 06/19/2016 07/31/2015--patient has lost a tremendous amount of weight but has residual excess skin and breast tissue that he would like to have surgically corrected. Patient referred to plastic surgery.   • History of bacterial pneumonia 06/19/2016 08/18/2006--patient presented with chest congestion, cough, fever. Chest x-ray revealed consolidation in the right lower lobe without pleural effusion. Patient was treated with Augmentin and I added a Z-Johnson which resulted in resolution.   • History of colon polyps, 12/17/2015--tubular adenoma x1 06/14/2021 December 17, 2015--colonoscopy revealed diverticulosis in the rectosigmoid, sigmoid, and descending colon.  There was one 5 mm polyp in the transverse colon which was removed.  Pathology returned tubular adenoma.   • History of Fracture of right cuboid bone 06/19/2016 12/24/2002--patient presented to the emergency room with right foot pain and swelling. X-ray revealed a nondisplaced cuboid fracture which was treated conservatively.   • History of gastric surgery for morbid obesity bastric bypass 06/19/2016 01/26/2005--laparoscopic Vitaliy-en-Y divided gastric bypass.   • History of gout 06/19/2016 03/02/2005--initial diagnosis and treatment of acute gouty arthritis.   • History of  Lumbar burst fracture 06/19/2016 11/21/2001--patient fell off a ladder while at work and landed on his buttocks and bilateral heels. He suffered an L1 burst fracture and a right wrist fracture. There was very minimal cord impingement. Treated conservatively with an oyster shell brace. Subsequent follow-up x-ray revealed healed fracture.   • History of Periorbital cellulitis 06/19/2016 07/05/2006--patient presented with right periorbital redness and swelling and chalazion. This revealed preseptal cellulitis with no evidence of abscess. Patient admitted the hospital and responded to IV antibiotics but also required IV steroids due to severe periocular inflammation. He responded well to this. He was reevaluated by the ophthalmologist 07/14/2006 and his vision was 20/20 and his lid   • History of tetanus, diphtheria, and acellular pertussis booster vaccination (Tdap) 06/19/2016 05/29/2012--TDaP given.   • Impaired fasting glucose 06/14/2021    10/14/2021--fasting serum glucose 103.  Strongly recommend low carbohydrate diet, exercise, and weight loss.   • Internal hemorrhoids 08/24/2021   • Morbid obesity (HCC) 06/19/2016 01/18/2005--patient was evaluated for minimally invasive bariatric surgery and this was subsequently performed. I do not have the details at the present time.   • Obstructive sleep apnea 06/19/2016 07/31/2015--patient reestablished. He has lost a significant amount of weight and has not used CPAP for years. He reports he sleeps well and has no hypersomnolence. Overnight oximetry ordered.   12/10/2002--split night sleep study with diagnostic CPAP titration. Total respiratory disturbance index 54.0 per sleep hour with oxygen desaturations to 67%. Improvement on CPAP at a pressure of 13, with a   • Pannus, abdominal 06/19/2016 07/31/2015--patient is also tremendous amount of weight and is left with quite a large abdominal pannus that he would like to have surgically corrected. Patient  referred to plastic surgery.   • Vitamin D deficiency 06/02/2021   • Vocal cord polyp 06/19/2016 08/11/2015--patient underwent a microlaryngoscopy with excision of vocal cord polyp. This was performed for chronic hoarseness. Pathology returned hyperplasia of squamous epithelium. No atypia detected.         Past Surgical History:   Procedure Laterality Date   • COLONOSCOPY  02/12/2002 02/12/2002--colonoscopy performed for bright red blood per rectum was a normal colonoscopy to the cecum with a good prep.   • COLONOSCOPY N/A 8/24/2021    Procedure: COLONOSCOPY TO CECUM;  Surgeon: Lei Freeman MD;  Location: [facility] ENDOSCOPY;  Service: General;  Laterality: N/A;  HISTORY OF COLON POLYPS  DIVERTICULOSIS, INTERNAL HEMORRHOIDS   • COLONOSCOPY W/ POLYPECTOMY  12/17/2015 December 17, 2015--colonoscopy revealed diverticulosis in the rectosigmoid, sigmoid, and descending colon.  There was one 5 mm polyp in the transverse colon which was removed.  Pathology returned tubular adenoma.   • LARYNGOSCOPY  08/11/2015 08/11/2015--patient underwent a microlaryngoscopy with excision of vocal cord polyp. This was performed for chronic hoarseness. Pathology returned hyperplasia of squamous epithelium. No atypia detected.   • VITALIY-EN-Y PROCEDURE  01/26/2005 01/26/2005--laparoscopic Vitaliy-en-Y divided gastric bypass.   • TONSILLECTOMY      7 years of age.         No Known Allergies        Current Outpatient Medications:   •  allopurinol (ZYLOPRIM) 300 MG tablet, TAKE 1 TABLET BY MOUTH DAILY FOR GOUT PREVENTION, Disp: 90 tablet, Rfl: 2  •  indomethacin (INDOCIN) 50 MG capsule, Take 1 p.o. 3 times daily as needed episodes of gout, Disp: 30 capsule, Rfl: 2  •  Tirzepatide (Mounjaro) 10 MG/0.5ML solution pen-injector, Inject 10 mg under the skin into the appropriate area as directed 1 (One) Time Per Week., Disp: 2 mL, Rfl: 6  •  vitamin D3 125 MCG (5000 UT) capsule capsule, 1 by mouth daily as directed for low  "vitamin D, Disp: 30 capsule, Rfl:       Family History   Problem Relation Age of Onset   • Hypertension Mother         Essential   • Atrial fibrillation Father    • Hypertension Father         Essential   • Gout Father    • Hyperlipidemia Father    • Diabetes Father         Type 2         Social History     Socioeconomic History   • Marital status:    Tobacco Use   • Smoking status: Former     Types: Cigarettes     Quit date:      Years since quittin.0   • Smokeless tobacco: Never   Vaping Use   • Vaping Use: Never used   Substance and Sexual Activity   • Alcohol use: Yes     Comment: Occasionally   • Sexual activity: Yes     Partners: Female         Vitals:    23 1229   BP: 115/76   Pulse: 56   Resp: 16   Temp: 96.6 °F (35.9 °C)   TempSrc: Temporal   SpO2: 100%   Weight: 116 kg (256 lb 12.8 oz)   Height: 177.8 cm (70\")        Body mass index is 36.85 kg/m².      Physical Exam:    General: Alert and oriented x 3.  No acute distress. Obese. Normal affect.  HEENT: Pupils equal, round, reactive to light; extraocular movements intact; sclerae nonicteric; pharynx, ear canals and TMs normal.  Neck: Without JVD, thyromegaly, bruit, or adenopathy.  Lungs: Clear to auscultation in all fields.  Heart: Regular rate and rhythm without murmur, rub, gallop, or click.  Abdomen: Soft, nontender, without hepatosplenomegaly or hernia.  Bowel sounds normal.  : Deferred.  Rectal: Deferred.  Extremities: Without clubbing, cyanosis, edema, or pulse deficit.  Neurologic: Intact without focal deficit.  Normal station and gait observed during ingress and egress from the examination room.  Skin: Without significant lesion.  Musculoskeletal: Unremarkable.    Lab/other results:      Assessment/Plan:     Diagnosis Plan   1. Impaired fasting glucose  Tirzepatide (Mounjaro) 10 MG/0.5ML solution pen-injector      2. Metabolic syndrome        3. Non morbid obesity        4. Need for shingles vaccine          Patient with " metabolic syndrome and impaired fasting glucose along with nonmorbid obesity who is at extremely high risk for diabetes is making good progress with Mounjaro.  He feels much better and his weight is down about 15 pounds.  He is tolerating it well.  I see no reason why we can increase the dose.    Plan is as follows: Increase Mounjaro to 10 mg subcutaneously weekly.  Patient will follow-up in 6 weeks to reassess.  He should contact me for any problems.  We will give patient his Shingrix vaccine when he comes in in 6 weeks.        Procedures

## 2023-01-20 ENCOUNTER — TELEPHONE (OUTPATIENT)
Dept: INTERNAL MEDICINE | Facility: CLINIC | Age: 58
End: 2023-01-20
Payer: COMMERCIAL

## 2023-01-20 DIAGNOSIS — R73.01 IMPAIRED FASTING GLUCOSE: Chronic | ICD-10-CM

## 2023-01-20 NOTE — TELEPHONE ENCOUNTER
Caller: Reginald Mays    Relationship: Self    Best call back number: [phone]    What medications are you currently taking:   Current Outpatient Medications on File Prior to Visit   Medication Sig Dispense Refill   • allopurinol (ZYLOPRIM) 300 MG tablet TAKE 1 TABLET BY MOUTH DAILY FOR GOUT PREVENTION 90 tablet 2   • indomethacin (INDOCIN) 50 MG capsule Take 1 p.o. 3 times daily as needed episodes of gout 30 capsule 2   • Tirzepatide (Mounjaro) 10 MG/0.5ML solution pen-injector Inject 10 mg under the skin into the appropriate area as directed 1 (One) Time Per Week. 2 mL 6   • vitamin D3 125 MCG (5000 UT) capsule capsule 1 by mouth daily as directed for low vitamin D 30 capsule      No current facility-administered medications on file prior to visit.        Which medication are you concerned about: LESLEY    Who prescribed you this medication: DR. LU     What are your concerns: NEEDS IT SENT AS 2 DOSES OF 5MG TO MAKE 10MG BECAUSE OF THE SHORTAGE/BACK ORDER ISSUES. [facility] CAN ONLY GET IT IN 5MG DOSAGES. PLEASE ADVISE IF THIS IS ACCEPTABLE. PATIENT [place]AV[place] TOWN Monday MORNING. COMPLETELY OUT OF MEDICATION.

## 2023-01-23 RX ORDER — TIRZEPATIDE 5 MG/.5ML
INJECTION, SOLUTION SUBCUTANEOUS
Qty: 2 ML | Refills: 3 | Status: SHIPPED | OUTPATIENT
Start: 2023-01-23

## 2023-03-06 DIAGNOSIS — Z87.39 HISTORY OF GOUT: Chronic | ICD-10-CM

## 2023-03-08 RX ORDER — ALLOPURINOL 300 MG/1
TABLET ORAL
Qty: 90 TABLET | Refills: 2 | Status: SHIPPED | OUTPATIENT
Start: 2023-03-08 | End: 2023-03-28

## 2023-03-28 DIAGNOSIS — Z87.39 HISTORY OF GOUT: Chronic | ICD-10-CM

## 2023-03-28 RX ORDER — ALLOPURINOL 300 MG/1
TABLET ORAL
Qty: 90 TABLET | Refills: 2 | Status: SHIPPED | OUTPATIENT
Start: 2023-03-28

## 2023-03-29 RX ORDER — ALLOPURINOL 300 MG/1
TABLET ORAL
Qty: 90 TABLET | Refills: 2 | OUTPATIENT
Start: 2023-03-29

## 2023-05-23 NOTE — TELEPHONE ENCOUNTER
Rx Refill Note  Requested Prescriptions     Pending Prescriptions Disp Refills   • Mounjaro 5 MG/0.5ML solution pen-injector [Pharmacy Med Name: MOUNJARO 5MG/0.5ML PF PEN INJ] 2 mL 3     Sig: INJECT 5MG UNDER THE SKIN IN THE APPROPRIATE AREA 1 TIME EVERY WEEK AS DIRECTED      Last office visit with prescribing clinician: 1/11/2023   Last telemedicine visit with prescribing clinician: Visit date not found   Next office visit with prescribing clinician: 7/19/2023           Jonny Arias MA  05/23/23, 13:09 EDT

## 2023-05-24 RX ORDER — TIRZEPATIDE 5 MG/.5ML
INJECTION, SOLUTION SUBCUTANEOUS
Qty: 2 ML | Refills: 3 | Status: SHIPPED | OUTPATIENT
Start: 2023-05-24

## 2023-09-06 ENCOUNTER — OFFICE VISIT (OUTPATIENT)
Dept: INTERNAL MEDICINE | Facility: CLINIC | Age: 58
End: 2023-09-06
Payer: COMMERCIAL

## 2023-09-06 VITALS
RESPIRATION RATE: 14 BRPM | SYSTOLIC BLOOD PRESSURE: 146 MMHG | DIASTOLIC BLOOD PRESSURE: 92 MMHG | OXYGEN SATURATION: 98 % | BODY MASS INDEX: 38.11 KG/M2 | WEIGHT: 266.2 LBS | HEART RATE: 51 BPM | HEIGHT: 70 IN

## 2023-09-06 DIAGNOSIS — Z98.890 HISTORY OF GASTRIC SURGERY: Chronic | ICD-10-CM

## 2023-09-06 DIAGNOSIS — I10 BENIGN ESSENTIAL HYPERTENSION: Chronic | ICD-10-CM

## 2023-09-06 DIAGNOSIS — Z51.81 THERAPEUTIC DRUG MONITORING: ICD-10-CM

## 2023-09-06 DIAGNOSIS — Z23 NEED FOR ZOSTER VACCINE: ICD-10-CM

## 2023-09-06 DIAGNOSIS — Z00.00 ROUTINE PHYSICAL EXAMINATION: ICD-10-CM

## 2023-09-06 DIAGNOSIS — G47.33 OBSTRUCTIVE SLEEP APNEA: Chronic | ICD-10-CM

## 2023-09-06 DIAGNOSIS — E55.9 VITAMIN D DEFICIENCY: Chronic | ICD-10-CM

## 2023-09-06 DIAGNOSIS — Z86.010 HISTORY OF COLON POLYPS: Chronic | ICD-10-CM

## 2023-09-06 DIAGNOSIS — E11.9 TYPE 2 DIABETES MELLITUS WITHOUT COMPLICATION, WITHOUT LONG-TERM CURRENT USE OF INSULIN: Primary | ICD-10-CM

## 2023-09-06 DIAGNOSIS — Z23 NEED FOR SHINGLES VACCINE: ICD-10-CM

## 2023-09-06 DIAGNOSIS — E66.9 NON MORBID OBESITY: Chronic | ICD-10-CM

## 2023-09-06 DIAGNOSIS — Z87.39 HISTORY OF GOUT: Chronic | ICD-10-CM

## 2023-09-06 RX ORDER — SEMAGLUTIDE 0.68 MG/ML
INJECTION, SOLUTION SUBCUTANEOUS
Qty: 3 ML | Refills: 11 | Status: SHIPPED | OUTPATIENT
Start: 2023-09-06

## 2023-09-06 NOTE — PROGRESS NOTES
09/06/2023    Patient Information  Reginald Mays                                                                                          [id number] [facility] [address]      1965  [unfilled]  [phone] (work)    Chief Complaint:     Follow-up medical problems as outlined below.    History of Present Illness:    Patient with history of metabolic syndrome/diabetes, history of gout, sleep apnea, hypertension, colon polyps, Non morbid obesity.  He presents today for a follow-up.  I previously had him on Mounjaro but the cost of this is prohibitive and he had to discontinue it.  Apparently not covered by his insurance.  His weight is up as expected.  He did not have lab work prior to this visit.  His past medical history reviewed and updated were necessary including health maintenance parameters.  This reveals he is currently up-to-date or else accounted for except for the Shingrix vaccine which I encouraged him to check with his insurance regarding coverage.  He has had the first injection and we can give him the second injection today.    Review of Systems   Constitutional: Positive for weight gain.   HENT: Negative.     Eyes: Negative.    Cardiovascular: Negative.    Respiratory: Negative.     Endocrine: Negative.    Hematologic/Lymphatic: Negative.    Skin: Negative.    Musculoskeletal: Negative.    Gastrointestinal: Negative.    Genitourinary: Negative.    Neurological: Negative.    Psychiatric/Behavioral: Negative.     Allergic/Immunologic: Negative.      Active Problems:    Patient Active Problem List   Diagnosis    Benign essential hypertension    History of gastric surgery for morbid obesity bastric bypass    History of gout    Gynecomastia, male    Non morbid obesity    Obstructive sleep apnea    Vitamin D deficiency    Routine physical examination    Therapeutic drug monitoring    Impaired fasting glucose    History of colon polyps, 8/24/2021--negative colonoscopy.   12/17/2015--tubular adenoma x1    Diverticulosis of colon    Type 2 diabetes mellitus without complication, without long-term current use of insulin         Past Medical History:   Diagnosis Date    Benign essential hypertension 06/19/2016 07/31/2015--patient reestablished as a patient. He has lost a tremendous amount of weight and his hypertension has resolved.   01/11/2005--initial diagnosis and treatment of hypertension. Patient has been poorly compliant.    Chronic hoarseness 06/19/2016 07/31/2015--patient presents with a 4-5 month history of chronic hoarseness. This gets particularly worse at the end of the day to the point that he can barely talk. He has not noticed any swollen lymph nodes or masses in his neck. Suspected vocal cord polyps. ENT referral.    Diverticulosis of colon 06/14/2021 August 24, 2021--colonoscopy revealed multiple small and large mouth diverticuli in the sigmoid colon.  No bleeding.  Nonbleeding internal hemorrhoids.  Otherwise normal exam.  No specimens taken.     December 17, 2015--colonoscopy revealed diverticulosis in the rectosigmoid, sigmoid, and descending colon.  There was one 5 mm polyp in the transverse colon which was removed.  Pathology returned tub    Gynecomastia, male 06/19/2016 07/31/2015--patient has lost a tremendous amount of weight but has residual excess skin and breast tissue that he would like to have surgically corrected. Patient referred to plastic surgery.    History of bacterial pneumonia 06/19/2016 08/18/2006--patient presented with chest congestion, cough, fever. Chest x-ray revealed consolidation in the right lower lobe without pleural effusion. Patient was treated with Augmentin and I added a Z-Johnson which resulted in resolution.    History of colon polyps, 8/24/2021--negative colonoscopy.  12/17/2015--tubular adenoma x1 06/14/2021 August 24, 2021--colonoscopy revealed multiple small and large mouth diverticuli in the sigmoid colon.  No  bleeding.  Nonbleeding internal hemorrhoids.  Otherwise normal exam.  No specimens taken.     December 17, 2015--colonoscopy revealed diverticulosis in the rectosigmoid, sigmoid, and descending colon.  There was one 5 mm polyp in the transverse colon which was removed.  Pathology returned tub    History of Fracture of right cuboid bone 06/19/2016 12/24/2002--patient presented to the emergency room with right foot pain and swelling. X-ray revealed a nondisplaced cuboid fracture which was treated conservatively.    History of gastric surgery for morbid obesity bastric bypass 06/19/2016 01/26/2005--laparoscopic Vitaliy-en-Y divided gastric bypass.    History of gout 06/19/2016 03/02/2005--initial diagnosis and treatment of acute gouty arthritis.    History of Lumbar burst fracture 06/19/2016 11/21/2001--patient fell off a ladder while at work and landed on his buttocks and bilateral heels. He suffered an L1 burst fracture and a right wrist fracture. There was very minimal cord impingement. Treated conservatively with an oyster shell brace. Subsequent follow-up x-ray revealed healed fracture.    History of Periorbital cellulitis 06/19/2016 07/05/2006--patient presented with right periorbital redness and swelling and chalazion. This revealed preseptal cellulitis with no evidence of abscess. Patient admitted the hospital and responded to IV antibiotics but also required IV steroids due to severe periocular inflammation. He responded well to this. He was reevaluated by the ophthalmologist 07/14/2006 and his vision was 20/20 and his lid    Impaired fasting glucose 06/14/2021    10/14/2021--fasting serum glucose 103.  Strongly recommend low carbohydrate diet, exercise, and weight loss.    Internal hemorrhoids 08/24/2021    Non morbid obesity 06/19/2016 01/18/2005--patient was evaluated for minimally invasive bariatric surgery and this was subsequently performed. I do not have the details at the present time.     Obstructive sleep apnea 06/19/2016 07/31/2015--patient reestablished. He has lost a significant amount of weight and has not used CPAP for years. He reports he sleeps well and has no hypersomnolence. Overnight oximetry ordered.   12/10/2002--split night sleep study with diagnostic CPAP titration. Total respiratory disturbance index 54.0 per sleep hour with oxygen desaturations to 67%. Improvement on CPAP at a pressure of 13, with a    Vitamin D deficiency 06/02/2021    Vocal cord polyp 06/19/2016 08/11/2015--patient underwent a microlaryngoscopy with excision of vocal cord polyp. This was performed for chronic hoarseness. Pathology returned hyperplasia of squamous epithelium. No atypia detected.         Past Surgical History:   Procedure Laterality Date    COLONOSCOPY  02/12/2002 02/12/2002--colonoscopy performed for bright red blood per rectum was a normal colonoscopy to the cecum with a good prep.    COLONOSCOPY N/A 8/24/2021    Procedure: COLONOSCOPY TO CECUM;  Surgeon: Lei Freeman MD;  Location: [facility] ENDOSCOPY;  Service: General;  Laterality: N/A;  HISTORY OF COLON POLYPS  DIVERTICULOSIS, INTERNAL HEMORRHOIDS    COLONOSCOPY W/ POLYPECTOMY  12/17/2015 December 17, 2015--colonoscopy revealed diverticulosis in the rectosigmoid, sigmoid, and descending colon.  There was one 5 mm polyp in the transverse colon which was removed.  Pathology returned tubular adenoma.    LARYNGOSCOPY  08/11/2015 08/11/2015--patient underwent a microlaryngoscopy with excision of vocal cord polyp. This was performed for chronic hoarseness. Pathology returned hyperplasia of squamous epithelium. No atypia detected.    VITALIY-EN-Y PROCEDURE  01/26/2005 01/26/2005--laparoscopic Vitaliy-en-Y divided gastric bypass.    TONSILLECTOMY      7 years of age.         No Known Allergies        Current Outpatient Medications:     allopurinol (ZYLOPRIM) 300 MG tablet, TAKE 1 TABLET BY MOUTH DAILY FOR GOUT PREVENTION, Disp:  "90 tablet, Rfl: 2    indomethacin (INDOCIN) 50 MG capsule, Take 1 p.o. 3 times daily as needed episodes of gout, Disp: 30 capsule, Rfl: 2    vitamin D3 125 MCG (5000 UT) capsule capsule, 1 by mouth daily as directed for low vitamin D, Disp: 30 capsule, Rfl:     Semaglutide,0.25 or 0.5MG/DOS, (Ozempic, 0.25 or 0.5 MG/DOSE,) 2 MG/3ML solution pen-injector, Inject 0.5 mg subcutaneously weekly as directed for diabetes, Disp: 3 mL, Rfl: 11      Family History   Problem Relation Age of Onset    Hypertension Mother         Essential    Atrial fibrillation Father     Hypertension Father         Essential    Gout Father     Hyperlipidemia Father     Diabetes Father         Type 2         Social History     Socioeconomic History    Marital status:    Tobacco Use    Smoking status: Former     Types: Cigarettes     Quit date:      Years since quittin.6    Smokeless tobacco: Never   Vaping Use    Vaping Use: Never used   Substance and Sexual Activity    Alcohol use: Yes     Comment: Occasionally    Sexual activity: Yes     Partners: Female         Vitals:    23 1107   BP: 146/92   BP Location: Right arm   Patient Position: Sitting   Cuff Size: Adult   Pulse: 51   Resp: 14   SpO2: 98%   Weight: 121 kg (266 lb 3.2 oz)   Height: 177.8 cm (70\")        Body mass index is 38.2 kg/m².      Physical Exam:    General: Alert and oriented x 3.  No acute distress.  Obese.Normal affect.  HEENT: Pupils equal, round, reactive to light; extraocular movements intact; sclerae nonicteric; pharynx, ear canals and TMs normal.  Neck: Without JVD, thyromegaly, bruit, or adenopathy.  Lungs: Clear to auscultation in all fields.  Heart: Regular rate and rhythm without murmur, rub, gallop, or click.  Abdomen: Soft, nontender, without hepatosplenomegaly or hernia.  Bowel sounds normal.  : Deferred.  Rectal: Deferred.  Extremities: Without clubbing, cyanosis, edema, or pulse deficit.  Neurologic: Intact without focal deficit.  Normal " station and gait observed during ingress and egress from the examination room.  Skin: Without significant lesion.  Musculoskeletal: Unremarkable.    Lab/other results:      Assessment/Plan:     Diagnosis Plan   1. Type 2 diabetes mellitus without complication, without long-term current use of insulin  Semaglutide,0.25 or 0.5MG/DOS, (Ozempic, 0.25 or 0.5 MG/DOSE,) 2 MG/3ML solution pen-injector    Comprehensive Metabolic Panel    Hemoglobin A1c    Urinalysis With Microscopic If Indicated (No Culture) - Urine, Clean Catch    Microalbumin / Creatinine Urine Ratio - Urine, Clean Catch      2. Non morbid obesity        3. History of gout  Uric Acid      4. Benign essential hypertension  Comprehensive Metabolic Panel    NMR LipoProfile    TSH    T4, Free    T3, Free      5. Obstructive sleep apnea        6. Vitamin D deficiency  Vitamin D,25-Hydroxy      7. Routine physical examination  CBC (No Diff)    Comprehensive Metabolic Panel    Hemoglobin A1c    NMR LipoProfile    TSH    T4, Free    T3, Free    Uric Acid    Vitamin D,25-Hydroxy    Urinalysis With Microscopic If Indicated (No Culture) - Urine, Clean Catch    PSA DIAGNOSTIC    Microalbumin / Creatinine Urine Ratio - Urine, Clean Catch      8. History of gastric surgery for morbid obesity bastric bypass        9. History of colon polyps, 12/17/2015--tubular adenoma x1        10. Therapeutic drug monitoring  CBC (No Diff)    PSA DIAGNOSTIC      11. Need for shingles vaccine  Shingrix Vaccine        Patient with type 2 diabetes who was previously on Mounjaro but the cost of this became prohibitive as insurance does not cover it.  He has several underlying comorbidities including a long history of Non morbid obesity and hypertension.  Blood pressure is up today which I think will come down with weight loss.  He has a history of gout and is on allopurinol and tolerating it well.  He also has sleep apnea as a complicating factor and has a follow-up with sleep medicine  soon.  He is also had a history of gastric bypass surgery for morbid obesity which improved things but he still has Non morbid obesity.  He has a history of colon polyps and is up-to-date on his colonoscopy.    Plan is as follows: Discontinue Mounjaro and start Ozempic.  We will titrate up as follows: 0.25 mg subcutaneously weekly x4 weeks and then increase to 0.5 mg weekly.  After 1 month of of the 0.5 mg dose and patient feels he can increase the dose then I will have him contact me and we will increase it to 1 mg subcutaneously weekly.  Shingrix vaccine given.  Patient will follow-up after November 1, 2023 with lab prior for his annual.        Procedures

## 2023-09-13 ENCOUNTER — TELEPHONE (OUTPATIENT)
Dept: INTERNAL MEDICINE | Facility: CLINIC | Age: 58
End: 2023-09-13

## 2023-09-13 NOTE — TELEPHONE ENCOUNTER
Caller: OTHER    Relationship: Other    Best call back number: [phone]    What form or medical record are you requesting: PRE- AUTH FOR OZEMPIC    Who is requesting this form or medical record from you: GRACIELA GAVIN     How would you like to receive the form or medical records (pick-up, mail, fax): CALL [phone]  If fax, what is the fax number: NA/  If mail, what is the address: N/A  If pick-up, provide patient with address and location detailsN/A    Timeframe paperwork needed: ASAP    Additional notes: [facility] HAS MORE CLINICAL QUESTIONS REGARDING THE MEDICATION OZEMPIC. REF# [id number]

## 2023-09-22 ENCOUNTER — OFFICE VISIT (OUTPATIENT)
Dept: SLEEP MEDICINE | Facility: HOSPITAL | Age: 58
End: 2023-09-22
Payer: COMMERCIAL

## 2023-09-22 VITALS
HEART RATE: 82 BPM | WEIGHT: 265 LBS | HEIGHT: 70 IN | BODY MASS INDEX: 37.94 KG/M2 | OXYGEN SATURATION: 97 % | SYSTOLIC BLOOD PRESSURE: 139 MMHG | DIASTOLIC BLOOD PRESSURE: 75 MMHG

## 2023-09-22 DIAGNOSIS — E66.9 OBESITY (BMI 30-39.9): ICD-10-CM

## 2023-09-22 DIAGNOSIS — G47.33 OBSTRUCTIVE SLEEP APNEA: Primary | ICD-10-CM

## 2023-09-22 PROCEDURE — G0463 HOSPITAL OUTPT CLINIC VISIT: HCPCS

## 2023-09-22 NOTE — PROGRESS NOTES
[facility]  Telephone: [phone] / Fax: [phone] [place]  Telephone: [phone] / Fax: [phone] [place]    PCP: Reginald Dee MD    Reason for visit: ERWIN f/u    Reginald Mays is a 58 y.o.male  was last seen at [facility] sleep lab in September 2022 . He uses the machine consistently and takes it with him when he travels. It controls the dry mouth. Sleep quality has improved on  the CPAP and excessive sleepiness/snoring is no longer an issue.  There is no complaint of aerophagia, or air leak. His bedtime schedule is MN-6am. ESS is 6. Machine is set at 7-15cm H2O. Pressures feel adequate. He uses nasal pillow mask. It fits well.    SH- no tobacco, 12 alc per week, 3 caffeine.    ROS- negative.    Current Medications:    Current Outpatient Medications:     allopurinol (ZYLOPRIM) 300 MG tablet, TAKE 1 TABLET BY MOUTH DAILY FOR GOUT PREVENTION, Disp: 90 tablet, Rfl: 2    indomethacin (INDOCIN) 50 MG capsule, Take 1 p.o. 3 times daily as needed episodes of gout, Disp: 30 capsule, Rfl: 2    Semaglutide,0.25 or 0.5MG/DOS, (Ozempic, 0.25 or 0.5 MG/DOSE,) 2 MG/3ML solution pen-injector, Inject 0.5 mg subcutaneously weekly as directed for diabetes, Disp: 3 mL, Rfl: 11    vitamin D3 125 MCG (5000 UT) capsule capsule, 1 by mouth daily as directed for low vitamin D, Disp: 30 capsule, Rfl:    also entered in Sleep Questionnaire    Patient  has a past medical history of Benign essential hypertension (06/19/2016), Chronic hoarseness (06/19/2016), Diverticulosis of colon (06/14/2021), Gynecomastia, male (06/19/2016), History of bacterial pneumonia (06/19/2016), History of colon polyps, 8/24/2021--negative colonoscopy.  12/17/2015--tubular adenoma x1 (06/14/2021), History of Fracture of right cuboid bone (06/19/2016), History of gastric surgery for morbid obesity bastric bypass (06/19/2016), History of gout (06/19/2016), History of Lumbar burst fracture (06/19/2016), History of  "Periorbital cellulitis (06/19/2016), Impaired fasting glucose (06/14/2021), Internal hemorrhoids (08/24/2021), Non morbid obesity (06/19/2016), Obstructive sleep apnea (06/19/2016), Vitamin D deficiency (06/02/2021), and Vocal cord polyp (06/19/2016).    I have reviewed the Past Medical History, Past Surgical History, Social History and Family History.    Vital Signs /75   Pulse 82   Ht 177.8 cm (70\")   Wt 120 kg (265 lb)   SpO2 97%   BMI 38.02 kg/m²  Body mass index is 38.02 kg/m².    General Alert and oriented. No acute distress noted   Pharynx/Throat Class  IV  Mallampati airway, large tongue, no evidence of redundant lateral pharyngeal tissue. No oral lesions. No thrush. Moist mucous membranes.   Head Normocephalic. Symmetrical. Atraumatic.    Nose No septal deviation. No drainage   Chest Wall Normal shape. Symmetric expansion with respiration. No tenderness.   Neck Trachea midline, no thyromegaly or adenopathy    Lungs Clear to auscultation bilaterally. No wheezes. No rhonchi. No rales. Respirations regular, even and unlabored.   Heart Regular rhythm and normal rate. Normal S1 and S2. No murmur   Abdomen Soft, non-tender and non-distended. Normal bowel sounds. No masses.   Extremities Moves all extremities well. No edema   Psychiatric Normal mood and affect.     Testing:  Download 6/22/23-9/19/23 76% use with average nightly use of 5 hours and 36 minutes on auto CPAP 7-15cm H2O, AHI 2.9/hr, leak 16.7 L.min.    Study-Diagnostic data available to date is as below and was reviewed on current visit:  8/18/21  The patient tolerated the home sleep testing with monitoring time of 412 minutes. The data obtained make this a technically adequate study. The apnea hypopneas index(AHI) was 27.6 per sleep hour.  The AHI during supine position was 25.8 per sleep hour. Mean heart rate of 59 BPM.  Snoring was noted 32.8% of sleep time. Lowest oxygen saturation during the study was 54%. Saturation below 89% was noted " for 28.1 mins.   10/11/21  Overnight titration study from 10:31 PM to 4:45 AM.  Sleep efficiency 87.9% with 5.33 hours total sleep time.  Sleep distribution shows somewhat reduced REM sleep at 9.5%.  Apnea hypopnea reduced.  During 28 minutes of supine sleep however a elevated AHI of 25 was still noted.  Oxygen saturation remained above 88%.  Arousal index 15.6 events an hour.  PLM index 25.5.  Patient at 2.3% times snoring.  CPAP increased from 7 to 12 cm water pressure.  Maximum sleep at 9 cm water pressure with 21 minutes of REM sleep.  Patient achieved minimal REM sleep in supine position.  There are indications for need of higher pressures in supine REM stage.    Impression:  1. Obstructive sleep apnea    2. Obesity (BMI 30-39.9)          Plan:  Patient uses the CPAP device and benefits from its use in terms of reduction of hypersomnia and snoring. He replaces the accessories in regular intervals.     AHI appears to be within adequate range. I reviewed download report and original sleep study report with the patient. I advised pt to contact us if PAP pressures feel excessive or insufficient.    Weight loss will be strongly beneficial to reduce the severity of sleep-disordered breathing.  Caution during activities that require prolonged concentration is strongly advised if sleepiness returns.       Follow up with Dr. Barnes in one year    Thank you for allowing me to participate in your patient's care.      CALLIE Loo  [facility]  Phone: [phone]      Part of this note may be an electronic transcription/translation of spoken language to printed text using the Dragon Dictation System.

## 2023-11-07 ENCOUNTER — OFFICE VISIT (OUTPATIENT)
Dept: INTERNAL MEDICINE | Facility: CLINIC | Age: 58
End: 2023-11-07
Payer: COMMERCIAL

## 2023-11-07 VITALS
OXYGEN SATURATION: 97 % | RESPIRATION RATE: 12 BRPM | HEART RATE: 72 BPM | DIASTOLIC BLOOD PRESSURE: 78 MMHG | BODY MASS INDEX: 38.05 KG/M2 | WEIGHT: 265.8 LBS | HEIGHT: 70 IN | SYSTOLIC BLOOD PRESSURE: 134 MMHG

## 2023-11-07 DIAGNOSIS — Z51.81 THERAPEUTIC DRUG MONITORING: ICD-10-CM

## 2023-11-07 DIAGNOSIS — Z87.39 HISTORY OF GOUT: Chronic | ICD-10-CM

## 2023-11-07 DIAGNOSIS — Z23 NEED FOR PNEUMOCOCCAL 20-VALENT CONJUGATE VACCINATION: ICD-10-CM

## 2023-11-07 DIAGNOSIS — Z86.010 HISTORY OF COLON POLYPS: Chronic | ICD-10-CM

## 2023-11-07 DIAGNOSIS — Z98.890 HISTORY OF GASTRIC SURGERY: Chronic | ICD-10-CM

## 2023-11-07 DIAGNOSIS — Z00.00 ROUTINE PHYSICAL EXAMINATION: Primary | ICD-10-CM

## 2023-11-07 DIAGNOSIS — E55.9 VITAMIN D DEFICIENCY: Chronic | ICD-10-CM

## 2023-11-07 DIAGNOSIS — E11.9 TYPE 2 DIABETES MELLITUS WITHOUT COMPLICATION, WITHOUT LONG-TERM CURRENT USE OF INSULIN: ICD-10-CM

## 2023-11-07 DIAGNOSIS — N62 GYNECOMASTIA, MALE: Chronic | ICD-10-CM

## 2023-11-07 DIAGNOSIS — E11.9 ENCOUNTER FOR DIABETIC FOOT EXAM: Chronic | ICD-10-CM

## 2023-11-07 DIAGNOSIS — I10 BENIGN ESSENTIAL HYPERTENSION: Chronic | ICD-10-CM

## 2023-11-07 DIAGNOSIS — E66.9 NON MORBID OBESITY: Chronic | ICD-10-CM

## 2023-11-07 DIAGNOSIS — G47.33 OBSTRUCTIVE SLEEP APNEA: Chronic | ICD-10-CM

## 2023-11-07 PROBLEM — R73.01 IMPAIRED FASTING GLUCOSE: Chronic | Status: RESOLVED | Noted: 2021-06-14 | Resolved: 2023-11-07

## 2023-11-07 RX ORDER — TIRZEPATIDE 2.5 MG/.5ML
2.5 INJECTION, SOLUTION SUBCUTANEOUS WEEKLY
Start: 2023-11-07

## 2023-11-07 NOTE — PROGRESS NOTES
11/07/2023    Patient Information  Reginald Mays                                                                                          [address] [facility] [address]      1965  [unfilled]  [phone] (work)    Chief Complaint:     Routine annual physical examination.  No new acute complaints.    History of Present Illness:    Patient with medical problems including type 2 diabetes and Non morbid obesity presents today for his routine annual physical exam.  Patient had lab work in order to monitor these chronic medical issues.  His past medical history reviewed and updated were necessary including health maintenance parameters.  This reveals he will be up-to-date or else accounted for after today's visit.  See below.    Review of Systems   Constitutional: Negative.   HENT: Negative.     Eyes: Negative.    Cardiovascular: Negative.    Respiratory: Negative.     Endocrine: Negative.    Hematologic/Lymphatic: Negative.    Skin: Negative.    Musculoskeletal: Negative.    Gastrointestinal: Negative.    Genitourinary: Negative.    Neurological: Negative.    Psychiatric/Behavioral: Negative.     Allergic/Immunologic: Negative.        Active Problems:    Patient Active Problem List   Diagnosis    Benign essential hypertension    History of gastric surgery for morbid obesity bastric bypass    History of gout    Gynecomastia, male    Non morbid obesity    Obstructive sleep apnea    Vitamin D deficiency    Routine physical examination    Therapeutic drug monitoring    History of colon polyps, 8/24/2021--negative colonoscopy.  12/17/2015--tubular adenoma x1    Diverticulosis of colon    Type 2 diabetes mellitus without complication, without long-term current use of insulin    Encounter for diabetic foot exam         Past Medical History:   Diagnosis Date    Benign essential hypertension 06/19/2016 07/31/2015--patient reestablished as a patient. He has lost a tremendous amount of weight  and his hypertension has resolved.   01/11/2005--initial diagnosis and treatment of hypertension. Patient has been poorly compliant.    Chronic hoarseness 06/19/2016 07/31/2015--patient presents with a 4-5 month history of chronic hoarseness. This gets particularly worse at the end of the day to the point that he can barely talk. He has not noticed any swollen lymph nodes or masses in his neck. Suspected vocal cord polyps. ENT referral.    Diverticulosis of colon 06/14/2021 August 24, 2021--colonoscopy revealed multiple small and large mouth diverticuli in the sigmoid colon.  No bleeding.  Nonbleeding internal hemorrhoids.  Otherwise normal exam.  No specimens taken.     December 17, 2015--colonoscopy revealed diverticulosis in the rectosigmoid, sigmoid, and descending colon.  There was one 5 mm polyp in the transverse colon which was removed.  Pathology returned tub    Gynecomastia, male 06/19/2016 07/31/2015--patient has lost a tremendous amount of weight but has residual excess skin and breast tissue that he would like to have surgically corrected. Patient referred to plastic surgery.    History of bacterial pneumonia 06/19/2016 08/18/2006--patient presented with chest congestion, cough, fever. Chest x-ray revealed consolidation in the right lower lobe without pleural effusion. Patient was treated with Augmentin and I added a Z-Johnson which resulted in resolution.    History of colon polyps, 8/24/2021--negative colonoscopy.  12/17/2015--tubular adenoma x1 06/14/2021 August 24, 2021--colonoscopy revealed multiple small and large mouth diverticuli in the sigmoid colon.  No bleeding.  Nonbleeding internal hemorrhoids.  Otherwise normal exam.  No specimens taken.     December 17, 2015--colonoscopy revealed diverticulosis in the rectosigmoid, sigmoid, and descending colon.  There was one 5 mm polyp in the transverse colon which was removed.  Pathology returned tub    History of Fracture of right cuboid  bone 06/19/2016 12/24/2002--patient presented to the emergency room with right foot pain and swelling. X-ray revealed a nondisplaced cuboid fracture which was treated conservatively.    History of gastric surgery for morbid obesity bastric bypass 06/19/2016 01/26/2005--laparoscopic Vitaliy-en-Y divided gastric bypass.    History of gout 06/19/2016 03/02/2005--initial diagnosis and treatment of acute gouty arthritis.    History of Lumbar burst fracture 06/19/2016 11/21/2001--patient fell off a ladder while at work and landed on his buttocks and bilateral heels. He suffered an L1 burst fracture and a right wrist fracture. There was very minimal cord impingement. Treated conservatively with an oyster shell brace. Subsequent follow-up x-ray revealed healed fracture.    History of Periorbital cellulitis 06/19/2016 07/05/2006--patient presented with right periorbital redness and swelling and chalazion. This revealed preseptal cellulitis with no evidence of abscess. Patient admitted the hospital and responded to IV antibiotics but also required IV steroids due to severe periocular inflammation. He responded well to this. He was reevaluated by the ophthalmologist 07/14/2006 and his vision was 20/20 and his lid    Impaired fasting glucose 06/14/2021    10/14/2021--fasting serum glucose 103.  Strongly recommend low carbohydrate diet, exercise, and weight loss.    Internal hemorrhoids 08/24/2021    Non morbid obesity 06/19/2016 01/18/2005--patient was evaluated for minimally invasive bariatric surgery and this was subsequently performed. I do not have the details at the present time.    Obstructive sleep apnea 06/19/2016 07/31/2015--patient reestablished. He has lost a significant amount of weight and has not used CPAP for years. He reports he sleeps well and has no hypersomnolence. Overnight oximetry ordered.   12/10/2002--split night sleep study with diagnostic CPAP titration. Total respiratory disturbance  index 54.0 per sleep hour with oxygen desaturations to 67%. Improvement on CPAP at a pressure of 13, with a    Vitamin D deficiency 06/02/2021    Vocal cord polyp 06/19/2016 08/11/2015--patient underwent a microlaryngoscopy with excision of vocal cord polyp. This was performed for chronic hoarseness. Pathology returned hyperplasia of squamous epithelium. No atypia detected.         Past Surgical History:   Procedure Laterality Date    COLONOSCOPY  02/12/2002 02/12/2002--colonoscopy performed for bright red blood per rectum was a normal colonoscopy to the cecum with a good prep.    COLONOSCOPY N/A 8/24/2021    Procedure: COLONOSCOPY TO CECUM;  Surgeon: Lei Freeman MD;  Location: [facility] ENDOSCOPY;  Service: General;  Laterality: N/A;  HISTORY OF COLON POLYPS  DIVERTICULOSIS, INTERNAL HEMORRHOIDS    COLONOSCOPY W/ POLYPECTOMY  12/17/2015 December 17, 2015--colonoscopy revealed diverticulosis in the rectosigmoid, sigmoid, and descending colon.  There was one 5 mm polyp in the transverse colon which was removed.  Pathology returned tubular adenoma.    LARYNGOSCOPY  08/11/2015 08/11/2015--patient underwent a microlaryngoscopy with excision of vocal cord polyp. This was performed for chronic hoarseness. Pathology returned hyperplasia of squamous epithelium. No atypia detected.    VITALIY-EN-Y PROCEDURE  01/26/2005 01/26/2005--laparoscopic Vitaliy-en-Y divided gastric bypass.    TONSILLECTOMY      7 years of age.         No Known Allergies        Current Outpatient Medications:     allopurinol (ZYLOPRIM) 300 MG tablet, TAKE 1 TABLET BY MOUTH DAILY FOR GOUT PREVENTION, Disp: 90 tablet, Rfl: 2    vitamin D3 125 MCG (5000 UT) capsule capsule, 1 by mouth daily as directed for low vitamin D, Disp: 30 capsule, Rfl:     Tirzepatide (Mounjaro) 2.5 MG/0.5ML solution pen-injector, Inject 0.5 mL under the skin into the appropriate area as directed 1 (One) Time Per Week., Disp: , Rfl:       Family History  "  Problem Relation Age of Onset    Hypertension Mother         Essential    Atrial fibrillation Father     Hypertension Father         Essential    Gout Father     Hyperlipidemia Father     Diabetes Father         Type 2         Social History     Socioeconomic History    Marital status:    Tobacco Use    Smoking status: Former     Types: Cigarettes     Quit date:      Years since quittin.8    Smokeless tobacco: Never   Vaping Use    Vaping Use: Never used   Substance and Sexual Activity    Alcohol use: Yes     Comment: Occasionally    Sexual activity: Yes     Partners: Female         Vitals:    23 1236   BP: 134/78   BP Location: Right arm   Patient Position: Sitting   Cuff Size: Adult   Pulse: 72   Resp: 12   SpO2: 97%   Weight: 121 kg (265 lb 12.8 oz)   Height: 177.8 cm (70\")        Body mass index is 38.14 kg/m².      Physical Exam:    General: Alert and oriented x 3.  No acute distress.  Obese.  Normal affect.  HEENT: Pupils equal, round, reactive to light; extraocular movements intact; sclerae nonicteric; pharynx, ear canals and TMs normal.  Neck: Without JVD, thyromegaly, bruit, or adenopathy.  Lungs: Clear to auscultation in all fields.  Heart: Regular rate and rhythm without murmur, rub, gallop, or click.  Abdomen: Soft, nontender, without hepatosplenomegaly or hernia.  Bowel sounds normal.  : Deferred.  Rectal: Deferred.  Extremities: Without clubbing, cyanosis, edema, or pulse deficit.  Neurologic: Intact without focal deficit.  Normal station and gait observed during ingress and egress from the examination room.  Skin: Without significant lesion.  Musculoskeletal: Unremarkable.    2023--routine diabetic foot exam reveals no evidence of diabetic foot ulcer or preulcerative callus.  Distal pulses palpable and sensation intact.    Lab/other results:    CBC normal.  CMP normal.  Hemoglobin A1c 5.6.  Total cholesterol is 164, triglycerides 71, LDL particle #625, HDL " particle #38.4.  Thyroid function tests are normal.  Uric acid normal at 5.1.  Vitamin D normal at 39.6.  Urine is normal.  PSA 0.8.  Microalbumin/creatinine ratio normal at 11.    Assessment/Plan:     Diagnosis Plan   1. Routine physical examination        2. Type 2 diabetes mellitus without complication, without long-term current use of insulin  Tirzepatide (Mounjaro) 2.5 MG/0.5ML solution pen-injector      3. Non morbid obesity        4. Obstructive sleep apnea        5. Benign essential hypertension        6. Vitamin D deficiency        7. History of gout        8. Encounter for diabetic foot exam        9. Gynecomastia, male        10. History of colon polyps, 8/24/2021--negative colonoscopy.  12/17/2015--tubular adenoma x1        11. History of gastric surgery for morbid obesity bastric bypass        12. Therapeutic drug monitoring        13. Need for pneumococcal 20-valent conjugate vaccination            Patient presents for his routine annual physical exam/preventative visit.  He has type 2 diabetes which seems to be improving.  He is no longer on semaglutide due to availability.  This medication or another 1 like it such as Mounjaro would be ideal for this patient to help him with his Non morbid obesity.  He is getting really close to having morbid obesity.  Patient has sleep apnea and he was reevaluated recently by sleep medicine for this.  Hypertension appears to be controlled without medication at the present time.  Uric acid levels are in the normal range with allopurinol.  He has a history of colon polyps and is up-to-date on colonoscopy.  He also has a history of previous gastric bypass surgery which apparently failed.    Several preventative health issues discussed including review of vaccinations and recommendations, including dietary issues, exercise and weight loss.  Safe sex practices discussed.  Patient advised to wear seatbelt whenever driving and avoid texting and driving.  Also advised to  look both ways before crossing the street.  Colon cancer prevention discussed and is up-to-date with colonoscopy.  Advised to avoid tobacco products and minimize alcohol consumption.    Plan is as follows: Continue aggressive low carbohydrate diet and weight loss efforts.  Patient's insurance will not cover Ozempic or Mounjaro.  Recommend Prevnar 20.    Addendum: After further consideration we will restart the Mounjaro.  Patient lost weight and had improved glucose control on 5 mg subcutaneously weekly.  I am going to try to keep patient in samples for at least the next 6 months to facilitate the weight loss and perhaps even corrected diabetes.      Procedures

## 2023-11-15 ENCOUNTER — TELEPHONE (OUTPATIENT)
Dept: INTERNAL MEDICINE | Facility: CLINIC | Age: 58
End: 2023-11-15

## 2023-11-15 NOTE — TELEPHONE ENCOUNTER
Caller: DRAGAN FREEMAN    Relationship:PATIENT    Callback number:    Is it ok to leave a message: [x] Yes [] No    Requested medication for samples: MOUNJARO    How much medication does the patient currently have left: PATIENT IS OUT    Who will be picking up the samples: YES    Do you need information about patient financial assistance for this medication: [] Yes [x] No    Additional details provided: PATIENT IS CALLING IN TO SEE IF THE OFFICE HAS SAMPLES IN YET. HE WANTS TO BE CALLED WITH AN ANSWER.

## 2023-11-21 NOTE — TELEPHONE ENCOUNTER
"Relay     \"We now have mounjaro samples pt may  11/21 or 11/22, we will be closed Thursday and Friday.\"                 "

## 2023-12-02 DIAGNOSIS — Z87.39 HISTORY OF GOUT: Chronic | ICD-10-CM

## 2023-12-02 RX ORDER — ALLOPURINOL 300 MG/1
TABLET ORAL
Qty: 90 TABLET | Refills: 1 | Status: SHIPPED | OUTPATIENT
Start: 2023-12-02

## 2024-02-16 DIAGNOSIS — E11.9 TYPE 2 DIABETES MELLITUS WITHOUT COMPLICATION, WITHOUT LONG-TERM CURRENT USE OF INSULIN: Primary | Chronic | ICD-10-CM

## 2024-07-08 DIAGNOSIS — Z87.39 HISTORY OF GOUT: Chronic | ICD-10-CM

## 2024-07-08 RX ORDER — ALLOPURINOL 300 MG/1
TABLET ORAL
Qty: 90 TABLET | Refills: 0 | Status: SHIPPED | OUTPATIENT
Start: 2024-07-08

## 2024-10-08 ENCOUNTER — OFFICE VISIT (OUTPATIENT)
Dept: SLEEP MEDICINE | Facility: HOSPITAL | Age: 59
End: 2024-10-08
Payer: COMMERCIAL

## 2024-10-08 VITALS
DIASTOLIC BLOOD PRESSURE: 102 MMHG | OXYGEN SATURATION: 98 % | WEIGHT: 267.6 LBS | HEART RATE: 57 BPM | HEIGHT: 70 IN | SYSTOLIC BLOOD PRESSURE: 168 MMHG | BODY MASS INDEX: 38.31 KG/M2

## 2024-10-08 DIAGNOSIS — G47.33 OBSTRUCTIVE SLEEP APNEA, ADULT: Primary | ICD-10-CM

## 2024-10-08 DIAGNOSIS — E66.9 OBESITY (BMI 30-39.9): ICD-10-CM

## 2024-10-08 PROCEDURE — G0463 HOSPITAL OUTPT CLINIC VISIT: HCPCS

## 2024-10-08 NOTE — PROGRESS NOTES
[facility] SLEEP MEDICINE  [address]  [place] [address] [phone]    PCP: Reginald eDe MD    Reason for visit:  Sleep disorders: ERWIN    Reginald is a 59 y.o.male who was seen in the [facility] Disorders Center today. Annual fu. He does not use his machine on weekends or vacations. He sleeps from 11pm to 7am. Does not notice much difference with CPAP. However he does have less dry mouth if he uses it.  Addy Sleepiness Scale is 5. Caffeine 4 per day. Alcohol 10-14 per week.    Reginald  reports that he quit smoking about 29 years ago. His smoking use included cigarettes. He has never used smokeless tobacco.    Pertinent Positive Review of Systems of denies  Rest of Review of Systems was negative as recorded in Sleep Questionnaire.    Patient  has a past medical history of Benign essential hypertension (06/19/2016), Chronic hoarseness (06/19/2016), Diverticulosis of colon (06/14/2021), Gynecomastia, male (06/19/2016), History of bacterial pneumonia (06/19/2016), History of colon polyps, 8/24/2021--negative colonoscopy.  12/17/2015--tubular adenoma x1 (06/14/2021), History of Fracture of right cuboid bone (06/19/2016), History of gastric surgery for morbid obesity bastric bypass (06/19/2016), History of gout (06/19/2016), History of Lumbar burst fracture (06/19/2016), History of Periorbital cellulitis (06/19/2016), Impaired fasting glucose (06/14/2021), Internal hemorrhoids (08/24/2021), Non morbid obesity (06/19/2016), Obstructive sleep apnea (06/19/2016), Vitamin D deficiency (06/02/2021), and Vocal cord polyp (06/19/2016).     Current Medications:    Current Outpatient Medications:     allopurinol (ZYLOPRIM) 300 MG tablet, TAKE 1 TABLET BY MOUTH DAILY FOR GOUT PREVENTION, Disp: 90 tablet, Rfl: 0    Tirzepatide (MOUNJARO) 2.5 MG/0.5ML solution pen-injector pen, Inject 2.5 mg subcutaneously weekly as directed, Disp: 2 mL, Rfl: 6    vitamin D3 125 MCG (5000 UT) capsule capsule,  "1 by mouth daily as directed for low vitamin D, Disp: 30 capsule, Rfl:    also entered in Sleep Questionnaire         Vital Signs: BP (!) 168/102   Pulse 57   Ht 177.8 cm (70\")   Wt 121 kg (267 lb 9.6 oz)   SpO2 98%   BMI 38.40 kg/m²     Body mass index is 38.4 kg/m².       Tongue: Large       Dentition: good       Pharynx: Posterior pharyngeal pillars are unable   Mallampatti: IV (only hard palate visible)        General: Alert. Cooperative. Well developed. No acute distress.             Head:  Normocephalic. Symmetrical. Atraumatic.              Nose: No septal deviation. No drainage.          Throat: No oral lesions. No thrush. Moist mucous membranes.    Chest Wall:  Normal shape. Symmetric expansion with respiration. No tenderness.             Neck:  Trachea midline.           Lungs:  Clear to auscultation bilaterally. No wheezes. No rhonchi. No rales. Respirations regular, even and unlabored.            Heart:  Regular rhythm and normal rate. Normal S1 and S2. No murmur.     Abdomen:  Soft, non-tender and non-distended. Normal bowel sounds. No masses.  Extremities:  Moves all extremities well. No edema.    Psychiatric: Normal mood and affect.    Diagnostic data available to date is as below and was reviewed on current visit:  8/18/21  The patient tolerated the home sleep testing with monitoring time of 412 minutes. The data obtained make this a technically adequate study. The apnea hypopneas index(AHI) was 27.6 per sleep hour.  The AHI during supine position was 25.8 per sleep hour. Mean heart rate of 59 BPM.  Snoring was noted 32.8% of sleep time. Lowest oxygen saturation during the study was 54%. Saturation below 89% was noted for 28.1 mins.   10/11/21  Overnight titration study from 10:31 PM to 4:45 AM.  Sleep efficiency 87.9% with 5.33 hours total sleep time.  Sleep distribution shows somewhat reduced REM sleep at 9.5%.  Apnea hypopnea reduced.  During 28 minutes of supine sleep however a elevated AHI " "of 25 was still noted.  Oxygen saturation remained above 88%.  Arousal index 15.6 events an hour.  PLM index 25.5.  Patient at 2.3% times snoring.  CPAP increased from 7 to 12 cm water pressure.  Maximum sleep at 9 cm water pressure with 21 minutes of REM sleep.  Patient achieved minimal REM sleep in supine position.  There are indications for need of higher pressures in supine REM stage.    No results found for: \"IRON\", \"TIBC\", \"FERRITIN\"    Most current available usage data reviewed on 10/08/2024:        "[employer] Company: [employer]    Prescription to "[employer] for replacement supplies as below:    nasal pillow      Description Replacement    Nasal PILLOWS     x A 7034 Nasal Pillows  every 3 mth   x A 7033 Repl Nasal Pillows  2 per mth    Nasal MASK/CUSHION      A 7034 Nasal Mask/Cushion  every 3 mth    A 7032 Repl Nasal Mask/Cushion  2 per mth    Full Face MASK      A 7030 Full Face Mask  every 3 mth    A 7031 Repl Face Mask  1 per mth      A 4604 Heated Tubing  every 3 mth    A 7037 Standard Tubing  every 3 mth   x A 7035 Headgear  every 3 mth   x A 7046 Repl Humidifier Chamber  every 6 yrs   x A 7038 Disposable Filters  2 per mth   x A 7039 Non-disposable Filter  every 6 mth   x A 7036 Chin Strap  every 6 mth     Orders Placed This Encounter   Procedures    Pulmonary Results Scan          Impression:  1. Obstructive sleep apnea, adult    2. Obesity (BMI 30-39.9)        Plan:  Reginald has at least moderate sleep apnea in all body positions. He was advised to improve compliance. Therefore he will get spare (refurbished) machine for [facility] and use both machines. Script provided. Otherwise compliant and doing well.    I reiterated the importance of effective treatment of obstructive sleep apnea with PAP machine.  Cardiovascular health risks of untreated sleep apnea were again reviewed.  Patient was asked to remain cautious if there is persistent hypersomnolence. The benefit of weight loss in reducing severity of obstructive " sleep apnea was discussed.  Patient would benefit from adhering to a strict diet to achieve ideal BMI.     Change of PAP supplies regularly is important for effective use.  Change of cushion on the mask or plugs on nasal pillows along with disposable filters once every month and change of mask frame, tubing, headgear and Velcro straps every 6 months at the minimum was reiterated.    This patient is compliant with PAP machine and benefits from its use.  Apnea hypopneas index is corrected/improved.  Daytime hypersomnolence has resolved.     Patient will follow up in this clinic in 1 year APRN    Thank you for allowing me to participate in your patient's care.    Electronically signed by Darrell Barnes MD, 10/08/24, 11:41 AM EDT.    Part of this note may be an electronic transcription/translation of spoken language to printed text using the Dragon Dictation System.

## 2024-10-11 DIAGNOSIS — Z87.39 HISTORY OF GOUT: Chronic | ICD-10-CM

## 2024-10-11 RX ORDER — ALLOPURINOL 300 MG/1
TABLET ORAL
Qty: 30 TABLET | Refills: 0 | Status: SHIPPED | OUTPATIENT
Start: 2024-10-11 | End: 2024-10-14

## 2024-10-13 DIAGNOSIS — Z87.39 HISTORY OF GOUT: Chronic | ICD-10-CM

## 2024-10-14 RX ORDER — ALLOPURINOL 300 MG/1
TABLET ORAL
Qty: 90 TABLET | Refills: 3 | Status: SHIPPED | OUTPATIENT
Start: 2024-10-14

## 2024-11-05 DIAGNOSIS — E11.9 TYPE 2 DIABETES MELLITUS WITHOUT COMPLICATION, WITHOUT LONG-TERM CURRENT USE OF INSULIN: Chronic | ICD-10-CM

## 2024-11-05 DIAGNOSIS — Z87.39 HISTORY OF GOUT: Chronic | ICD-10-CM

## 2024-11-05 DIAGNOSIS — N62 GYNECOMASTIA, MALE: Chronic | ICD-10-CM

## 2024-11-05 DIAGNOSIS — I10 BENIGN ESSENTIAL HYPERTENSION: Primary | Chronic | ICD-10-CM

## 2024-11-05 DIAGNOSIS — Z00.00 ROUTINE PHYSICAL EXAMINATION: ICD-10-CM

## 2024-11-05 DIAGNOSIS — E55.9 VITAMIN D DEFICIENCY: Chronic | ICD-10-CM

## 2024-11-06 DIAGNOSIS — Z00.00 ROUTINE PHYSICAL EXAMINATION: ICD-10-CM

## 2024-11-06 DIAGNOSIS — I10 BENIGN ESSENTIAL HYPERTENSION: Chronic | ICD-10-CM

## 2024-11-06 DIAGNOSIS — E11.9 TYPE 2 DIABETES MELLITUS WITHOUT COMPLICATION, WITHOUT LONG-TERM CURRENT USE OF INSULIN: Chronic | ICD-10-CM

## 2024-11-06 DIAGNOSIS — N62 GYNECOMASTIA, MALE: Chronic | ICD-10-CM

## 2024-11-06 DIAGNOSIS — E55.9 VITAMIN D DEFICIENCY: Chronic | ICD-10-CM

## 2024-11-06 DIAGNOSIS — Z87.39 HISTORY OF GOUT: Chronic | ICD-10-CM

## 2024-11-08 LAB
25(OH)D3+25(OH)D2 SERPL-MCNC: 31.2 NG/ML (ref 30–100)
ALBUMIN SERPL-MCNC: 4.5 G/DL (ref 3.9–4.9)
ALP SERPL-CCNC: 111 IU/L (ref 44–121)
ALT SERPL-CCNC: 30 IU/L (ref 0–44)
APPEARANCE UR: CLEAR
AST SERPL-CCNC: 29 IU/L (ref 0–40)
BASOPHILS # BLD AUTO: 0 X10E3/UL (ref 0–0.2)
BASOPHILS NFR BLD AUTO: 0 %
BILIRUB SERPL-MCNC: 0.6 MG/DL (ref 0–1.2)
BILIRUB UR QL STRIP: NEGATIVE
BUN SERPL-MCNC: 10 MG/DL (ref 8–27)
BUN/CREAT SERPL: 11 (ref 10–24)
CALCIUM SERPL-MCNC: 10.1 MG/DL (ref 8.6–10.2)
CHLORIDE SERPL-SCNC: 100 MMOL/L (ref 96–106)
CHOLEST SERPL-MCNC: 173 MG/DL (ref 100–199)
CO2 SERPL-SCNC: 24 MMOL/L (ref 20–29)
COLOR UR: YELLOW
CREAT SERPL-MCNC: 0.91 MG/DL (ref 0.76–1.27)
EGFRCR SERPLBLD CKD-EPI 2021: 96 ML/MIN/1.73
EOSINOPHIL # BLD AUTO: 0.2 X10E3/UL (ref 0–0.4)
EOSINOPHIL NFR BLD AUTO: 4 %
ERYTHROCYTE [DISTWIDTH] IN BLOOD BY AUTOMATED COUNT: 13.5 % (ref 11.6–15.4)
GLOBULIN SER CALC-MCNC: 2.6 G/DL (ref 1.5–4.5)
GLUCOSE SERPL-MCNC: 83 MG/DL (ref 70–99)
GLUCOSE UR QL STRIP: NEGATIVE
HBA1C MFR BLD: 5.5 % (ref 4.8–5.6)
HCT VFR BLD AUTO: 46.7 % (ref 37.5–51)
HDL SERPL-SCNC: 35.7 UMOL/L
HDLC SERPL-MCNC: 74 MG/DL
HGB BLD-MCNC: 15.2 G/DL (ref 13–17.7)
HGB UR QL STRIP: NEGATIVE
IMM GRANULOCYTES # BLD AUTO: 0 X10E3/UL (ref 0–0.1)
IMM GRANULOCYTES NFR BLD AUTO: 0 %
KETONES UR QL STRIP: ABNORMAL
LDL SERPL QN: 21.3 NM
LDL SERPL-SCNC: 591 NMOL/L
LDL SMALL SERPL-SCNC: <90 NMOL/L
LDLC SERPL CALC-MCNC: 82 MG/DL (ref 0–99)
LEUKOCYTE ESTERASE UR QL STRIP: NEGATIVE
LYMPHOCYTES # BLD AUTO: 1.9 X10E3/UL (ref 0.7–3.1)
LYMPHOCYTES NFR BLD AUTO: 29 %
MCH RBC QN AUTO: 30 PG (ref 26.6–33)
MCHC RBC AUTO-ENTMCNC: 32.5 G/DL (ref 31.5–35.7)
MCV RBC AUTO: 92 FL (ref 79–97)
MICRO URNS: ABNORMAL
MONOCYTES # BLD AUTO: 0.5 X10E3/UL (ref 0.1–0.9)
MONOCYTES NFR BLD AUTO: 8 %
NEUTROPHILS # BLD AUTO: 3.9 X10E3/UL (ref 1.4–7)
NEUTROPHILS NFR BLD AUTO: 59 %
NITRITE UR QL STRIP: NEGATIVE
PH UR STRIP: 6 [PH] (ref 5–7.5)
PLATELET # BLD AUTO: 264 X10E3/UL (ref 150–450)
POTASSIUM SERPL-SCNC: 4.5 MMOL/L (ref 3.5–5.2)
PROT SERPL-MCNC: 7.1 G/DL (ref 6–8.5)
PROT UR QL STRIP: NEGATIVE
RBC # BLD AUTO: 5.07 X10E6/UL (ref 4.14–5.8)
SODIUM SERPL-SCNC: 140 MMOL/L (ref 134–144)
SP GR UR STRIP: 1.01 (ref 1–1.03)
T3FREE SERPL-MCNC: 2.9 PG/ML (ref 2–4.4)
T4 FREE SERPL-MCNC: 1.4 NG/DL (ref 0.82–1.77)
TRIGL SERPL-MCNC: 95 MG/DL (ref 0–149)
TSH SERPL DL<=0.005 MIU/L-ACNC: 4.34 UIU/ML (ref 0.45–4.5)
URATE SERPL-MCNC: 5.5 MG/DL (ref 3.8–8.4)
UROBILINOGEN UR STRIP-MCNC: 0.2 MG/DL (ref 0.2–1)
WBC # BLD AUTO: 6.6 X10E3/UL (ref 3.4–10.8)

## 2024-11-13 ENCOUNTER — OFFICE VISIT (OUTPATIENT)
Dept: INTERNAL MEDICINE | Facility: CLINIC | Age: 59
End: 2024-11-13
Payer: COMMERCIAL

## 2024-11-13 VITALS
RESPIRATION RATE: 16 BRPM | BODY MASS INDEX: 34.93 KG/M2 | SYSTOLIC BLOOD PRESSURE: 138 MMHG | DIASTOLIC BLOOD PRESSURE: 92 MMHG | HEIGHT: 70 IN | OXYGEN SATURATION: 98 % | WEIGHT: 244 LBS | HEART RATE: 82 BPM | TEMPERATURE: 97.6 F

## 2024-11-13 DIAGNOSIS — E55.9 VITAMIN D DEFICIENCY: Chronic | ICD-10-CM

## 2024-11-13 DIAGNOSIS — Z23 NEED FOR INFLUENZA VACCINATION: ICD-10-CM

## 2024-11-13 DIAGNOSIS — E11.9 TYPE 2 DIABETES MELLITUS WITHOUT COMPLICATION, WITHOUT LONG-TERM CURRENT USE OF INSULIN: Chronic | ICD-10-CM

## 2024-11-13 DIAGNOSIS — I10 BENIGN ESSENTIAL HYPERTENSION: Chronic | ICD-10-CM

## 2024-11-13 DIAGNOSIS — Z00.00 ROUTINE PHYSICAL EXAMINATION: Primary | ICD-10-CM

## 2024-11-13 DIAGNOSIS — Z86.0100 HISTORY OF COLON POLYPS: Chronic | ICD-10-CM

## 2024-11-13 DIAGNOSIS — Z98.890 HISTORY OF GASTRIC SURGERY: Chronic | ICD-10-CM

## 2024-11-13 DIAGNOSIS — Z87.39 HISTORY OF GOUT: Chronic | ICD-10-CM

## 2024-11-13 DIAGNOSIS — E66.9 NON MORBID OBESITY: Chronic | ICD-10-CM

## 2024-11-13 DIAGNOSIS — G47.33 OBSTRUCTIVE SLEEP APNEA: Chronic | ICD-10-CM

## 2024-11-13 DIAGNOSIS — Z51.81 THERAPEUTIC DRUG MONITORING: ICD-10-CM

## 2024-11-13 DIAGNOSIS — E11.9 ENCOUNTER FOR DIABETIC FOOT EXAM: Chronic | ICD-10-CM

## 2024-11-13 PROCEDURE — 90656 IIV3 VACC NO PRSV 0.5 ML IM: CPT | Performed by: INTERNAL MEDICINE

## 2024-11-13 PROCEDURE — 99214 OFFICE O/P EST MOD 30 MIN: CPT | Performed by: INTERNAL MEDICINE

## 2024-11-13 PROCEDURE — 99396 PREV VISIT EST AGE 40-64: CPT | Performed by: INTERNAL MEDICINE

## 2024-11-13 PROCEDURE — 90471 IMMUNIZATION ADMIN: CPT | Performed by: INTERNAL MEDICINE

## 2024-11-13 RX ORDER — SEMAGLUTIDE 0.68 MG/ML
INJECTION, SOLUTION SUBCUTANEOUS
Start: 2024-11-13

## 2024-11-13 NOTE — PROGRESS NOTES
11/13/2024    Patient Information  Reginald Mays                                                                                          [id number] [facility] [address]      1965  [unfilled]  [phone] (work)    Chief Complaint:     Routine annual physical examination/preventative visit.  No new acute complaints.    History of Present Illness:    Patient with a history of medical problems as outlined below in assessment and plan presents today for his routine annual physical.  His past medical history reviewed and updated were necessary including health maintenance parameters.  This reveals he will be up-to-date or else accounted for after today's visit.    Review of Systems   Constitutional: Negative.   HENT: Negative.     Eyes: Negative.    Cardiovascular: Negative.    Respiratory: Negative.     Endocrine: Negative.    Hematologic/Lymphatic: Negative.    Skin: Negative.    Musculoskeletal: Negative.    Gastrointestinal: Negative.    Genitourinary: Negative.    Neurological: Negative.    Psychiatric/Behavioral: Negative.     Allergic/Immunologic: Negative.        Active Problems:    Patient Active Problem List   Diagnosis    Benign essential hypertension    History of gastric surgery for morbid obesity bastric bypass    History of gout    Gynecomastia, male    Non morbid obesity    Obstructive sleep apnea    Vitamin D deficiency    Routine physical examination    Therapeutic drug monitoring    History of colon polyps, 8/24/2021--negative colonoscopy.  12/17/2015--tubular adenoma x1    Diverticulosis of colon    Type 2 diabetes mellitus without complication, without long-term current use of insulin    Encounter for diabetic foot exam         Past Medical History:   Diagnosis Date    Benign essential hypertension 06/19/2016 07/31/2015--patient reestablished as a patient. He has lost a tremendous amount of weight and his hypertension has resolved.   01/11/2005--initial diagnosis  and treatment of hypertension. Patient has been poorly compliant.    Chronic hoarseness 06/19/2016 07/31/2015--patient presents with a 4-5 month history of chronic hoarseness. This gets particularly worse at the end of the day to the point that he can barely talk. He has not noticed any swollen lymph nodes or masses in his neck. Suspected vocal cord polyps. ENT referral.    Diverticulosis of colon 06/14/2021 August 24, 2021--colonoscopy revealed multiple small and large mouth diverticuli in the sigmoid colon.  No bleeding.  Nonbleeding internal hemorrhoids.  Otherwise normal exam.  No specimens taken.     December 17, 2015--colonoscopy revealed diverticulosis in the rectosigmoid, sigmoid, and descending colon.  There was one 5 mm polyp in the transverse colon which was removed.  Pathology returned tub    Gynecomastia, male 06/19/2016 07/31/2015--patient has lost a tremendous amount of weight but has residual excess skin and breast tissue that he would like to have surgically corrected. Patient referred to plastic surgery.    History of bacterial pneumonia 06/19/2016 08/18/2006--patient presented with chest congestion, cough, fever. Chest x-ray revealed consolidation in the right lower lobe without pleural effusion. Patient was treated with Augmentin and I added a Z-Johnson which resulted in resolution.    History of colon polyps, 8/24/2021--negative colonoscopy.  12/17/2015--tubular adenoma x1 06/14/2021 August 24, 2021--colonoscopy revealed multiple small and large mouth diverticuli in the sigmoid colon.  No bleeding.  Nonbleeding internal hemorrhoids.  Otherwise normal exam.  No specimens taken.     December 17, 2015--colonoscopy revealed diverticulosis in the rectosigmoid, sigmoid, and descending colon.  There was one 5 mm polyp in the transverse colon which was removed.  Pathology returned tub    History of Fracture of right cuboid bone 06/19/2016 12/24/2002--patient presented to the emergency room  with right foot pain and swelling. X-ray revealed a nondisplaced cuboid fracture which was treated conservatively.    History of gastric surgery for morbid obesity bastric bypass 06/19/2016 01/26/2005--laparoscopic Vitaliy-en-Y divided gastric bypass.    History of gout 06/19/2016 03/02/2005--initial diagnosis and treatment of acute gouty arthritis.    History of Lumbar burst fracture 06/19/2016 11/21/2001--patient fell off a ladder while at work and landed on his buttocks and bilateral heels. He suffered an L1 burst fracture and a right wrist fracture. There was very minimal cord impingement. Treated conservatively with an oyster shell brace. Subsequent follow-up x-ray revealed healed fracture.    History of Periorbital cellulitis 06/19/2016 07/05/2006--patient presented with right periorbital redness and swelling and chalazion. This revealed preseptal cellulitis with no evidence of abscess. Patient admitted the hospital and responded to IV antibiotics but also required IV steroids due to severe periocular inflammation. He responded well to this. He was reevaluated by the ophthalmologist 07/14/2006 and his vision was 20/20 and his lid    Impaired fasting glucose 06/14/2021    10/14/2021--fasting serum glucose 103.  Strongly recommend low carbohydrate diet, exercise, and weight loss.    Internal hemorrhoids 08/24/2021    Non morbid obesity 06/19/2016 01/18/2005--patient was evaluated for minimally invasive bariatric surgery and this was subsequently performed. I do not have the details at the present time.    Obstructive sleep apnea 06/19/2016 07/31/2015--patient reestablished. He has lost a significant amount of weight and has not used CPAP for years. He reports he sleeps well and has no hypersomnolence. Overnight oximetry ordered.   12/10/2002--split night sleep study with diagnostic CPAP titration. Total respiratory disturbance index 54.0 per sleep hour with oxygen desaturations to 67%. Improvement  on CPAP at a pressure of 13, with a    Vitamin D deficiency 06/02/2021    Vocal cord polyp 06/19/2016 08/11/2015--patient underwent a microlaryngoscopy with excision of vocal cord polyp. This was performed for chronic hoarseness. Pathology returned hyperplasia of squamous epithelium. No atypia detected.         Past Surgical History:   Procedure Laterality Date    COLONOSCOPY  02/12/2002 02/12/2002--colonoscopy performed for bright red blood per rectum was a normal colonoscopy to the cecum with a good prep.    COLONOSCOPY N/A 8/24/2021    Procedure: COLONOSCOPY TO CECUM;  Surgeon: Lei Freeman MD;  Location: [facility] ENDOSCOPY;  Service: General;  Laterality: N/A;  HISTORY OF COLON POLYPS  DIVERTICULOSIS, INTERNAL HEMORRHOIDS    COLONOSCOPY W/ POLYPECTOMY  12/17/2015 December 17, 2015--colonoscopy revealed diverticulosis in the rectosigmoid, sigmoid, and descending colon.  There was one 5 mm polyp in the transverse colon which was removed.  Pathology returned tubular adenoma.    LARYNGOSCOPY  08/11/2015 08/11/2015--patient underwent a microlaryngoscopy with excision of vocal cord polyp. This was performed for chronic hoarseness. Pathology returned hyperplasia of squamous epithelium. No atypia detected.    VITALIY-EN-Y PROCEDURE  01/26/2005 01/26/2005--laparoscopic Vitaliy-en-Y divided gastric bypass.    TONSILLECTOMY      7 years of age.         No Known Allergies        Current Outpatient Medications:     allopurinol (ZYLOPRIM) 300 MG tablet, TAKE 1 TABLET BY MOUTH DAILY FOR GOUT PREVENTION, Disp: 90 tablet, Rfl: 3    vitamin D3 125 MCG (5000 UT) capsule capsule, 1 by mouth daily as directed for low vitamin D, Disp: 30 capsule, Rfl:     Semaglutide,0.25 or 0.5MG/DOS, (Ozempic, 0.25 or 0.5 MG/DOSE,) 2 MG/3ML solution pen-injector, Inject 0.25 mg subcutaneously weekly x 4 weeks and then increase to 0.5 mg subcutaneously weekly., Disp: , Rfl:       Family History   Problem Relation Age of Onset     "Hypertension Mother         Essential    Diabetes Mother     Hyperlipidemia Mother     Atrial fibrillation Father     Hypertension Father         Essential    Gout Father     Hyperlipidemia Father     Diabetes Father         Type 2         Social History     Socioeconomic History    Marital status:    Tobacco Use    Smoking status: Former     Current packs/day: 0.00     Types: Cigarettes     Quit date:      Years since quittin.8    Smokeless tobacco: Never   Vaping Use    Vaping status: Never Used   Substance and Sexual Activity    Alcohol use: Yes     Alcohol/week: 7.0 standard drinks of alcohol     Types: 7 Cans of beer per week     Comment: Occasionally    Drug use: Never    Sexual activity: Yes     Partners: Female         Vitals:    24 1153   BP: 138/92   Pulse: 82   Resp: 16   Temp: 97.6 °F (36.4 °C)   TempSrc: Oral   SpO2: 98%   Weight: 111 kg (244 lb)   Height: 177.8 cm (70\")        Body mass index is 35.01 kg/m².      Physical Exam:    General: Alert and oriented x 3.  No acute distress.  Normal affect.  Obese.  HEENT: Pupils equal, round, reactive to light; extraocular movements intact; sclerae nonicteric; pharynx, ear canals and TMs normal.  Neck: Without JVD, thyromegaly, bruit, or adenopathy.  Lungs: Clear to auscultation in all fields.  Heart: Regular rate and rhythm without murmur, rub, gallop, or click.  Abdomen: Soft, nontender, without hepatosplenomegaly or hernia.  Bowel sounds normal.  : Deferred.  Rectal: Deferred.  Extremities: Without clubbing, cyanosis, edema, or pulse deficit.  Neurologic: Intact without focal deficit.  Normal station and gait observed during ingress and egress from the examination room.  Skin: Without significant lesion.  Musculoskeletal: Unremarkable.    2024--routine diabetic foot exam reveals no evidence of diabetic foot ulcer or preulcerative callus.  Distal pulses palpable.  Sensation intact.    Lab/other results:    Urinalysis " reveals 1+ ketones.  Vitamin D normal at 31.2.  Uric acid normal at 5.5.  Thyroid function tests are normal.  Total cholesterol 173, triglyceride 95, LDL particle #591, HDL particle #35.7.  Hemoglobin A1c 5.5.  CMP is normal.    Assessment/Plan:     Diagnosis Plan   1. Routine physical examination        2. Type 2 diabetes mellitus without complication, without long-term current use of insulin  Semaglutide,0.25 or 0.5MG/DOS, (Ozempic, 0.25 or 0.5 MG/DOSE,) 2 MG/3ML solution pen-injector      3. Benign essential hypertension        4. History of gout        5. Vitamin D deficiency        6. Obstructive sleep apnea        7. Non morbid obesity        8. History of gastric surgery for morbid obesity bastric bypass        9. History of colon polyps, 8/24/2021--negative colonoscopy.  12/17/2015--tubular adenoma x1        10. Encounter for diabetic foot exam        11. Therapeutic drug monitoring        12. Need for influenza vaccination          Patient presents for his routine annual physical and has stable medical problems listed with the exception of his blood pressure which is elevated today and has been elevated on other visits to other providers.  See below.  Patient with diabetes who has a normal A1c but unfortunately he can no longer obtain tirzepatide.  This happened just recently.  It appears that Ozempic may be covered at a reasonable cost.  Weight loss would definitely help with the blood pressure elevation.  I do not think he has any immediate danger but I explained to patient that long-term poorly controlled high blood pressure markedly increases risk for stroke and heart attack.    Several preventative health issues discussed including review of vaccinations and recommendations, including dietary issues, exercise and weight loss.  Safe sex practices discussed.  Patient advised to wear seatbelt whenever driving and avoid texting and driving.  Also advised to look both ways before crossing the street.  Colon  cancer prevention discussed and is up-to-date with colonoscopy.  Advised to avoid tobacco products and minimize alcohol consumption.    Plan is as follows: We discussed medication for the high blood pressure and patient would prefer to avoid that if at all possible.  Formally discontinue tirzepatide.  Start Ozempic 0.25 mg subcutaneously weekly for the first 2 weeks and if patient is doing well after that he can increase to 0.5 mg weekly dose.  Samples given.  I will have patient follow-up in about 6 weeks to reassess the situation including his blood pressure    ,  Procedures

## 2024-12-23 ENCOUNTER — OFFICE VISIT (OUTPATIENT)
Dept: INTERNAL MEDICINE | Facility: CLINIC | Age: 59
End: 2024-12-23
Payer: COMMERCIAL

## 2024-12-23 VITALS
BODY MASS INDEX: 36.22 KG/M2 | WEIGHT: 253 LBS | TEMPERATURE: 98.7 F | DIASTOLIC BLOOD PRESSURE: 88 MMHG | HEART RATE: 78 BPM | RESPIRATION RATE: 16 BRPM | SYSTOLIC BLOOD PRESSURE: 138 MMHG | OXYGEN SATURATION: 96 % | HEIGHT: 70 IN

## 2024-12-23 DIAGNOSIS — E11.9 TYPE 2 DIABETES MELLITUS WITHOUT COMPLICATION, WITHOUT LONG-TERM CURRENT USE OF INSULIN: Chronic | ICD-10-CM

## 2024-12-23 DIAGNOSIS — I10 BENIGN ESSENTIAL HYPERTENSION: Chronic | ICD-10-CM

## 2024-12-23 DIAGNOSIS — E66.9 NON MORBID OBESITY: Primary | Chronic | ICD-10-CM

## 2024-12-23 PROCEDURE — 99214 OFFICE O/P EST MOD 30 MIN: CPT | Performed by: INTERNAL MEDICINE

## 2024-12-23 NOTE — PROGRESS NOTES
12/23/2024    Patient Information  Reginald Mays                                                                                          [address] [facility] [address]      1965  [unfilled]  [phone] (work)    Chief Complaint:     Follow-up chronic medical problems including diabetes and Non morbid obesity.    History of Present Illness:    Patient has type 2 diabetes is here today for follow-up.  We started him on semaglutide/Ozempic about 6 weeks ago and is here today for follow-up.  Patient also has hypertension which has not been controlled but patient was hoping that weight loss would get it under control and he also has a history of gout.  Past medical history reviewed and updated were necessary including health maintenance parameters.  I encouraged him to get a diabetic eye exam.    Review of Systems   Constitutional: Negative.   HENT: Negative.     Eyes: Negative.    Cardiovascular: Negative.    Respiratory: Negative.     Endocrine: Negative.    Hematologic/Lymphatic: Negative.    Skin: Negative.    Musculoskeletal: Negative.    Gastrointestinal: Negative.    Genitourinary: Negative.    Neurological: Negative.    Psychiatric/Behavioral: Negative.     Allergic/Immunologic: Negative.        Active Problems:    Patient Active Problem List   Diagnosis    Benign essential hypertension    History of gastric surgery for morbid obesity bastric bypass    History of gout    Gynecomastia, male    Non morbid obesity    Obstructive sleep apnea    Vitamin D deficiency    Routine physical examination    Therapeutic drug monitoring    History of colon polyps, 8/24/2021--negative colonoscopy.  12/17/2015--tubular adenoma x1    Diverticulosis of colon    Type 2 diabetes mellitus without complication, without long-term current use of insulin    Encounter for diabetic foot exam         Past Medical History:   Diagnosis Date    Benign essential hypertension 06/19/2016 07/31/2015--patient  reestablished as a patient. He has lost a tremendous amount of weight and his hypertension has resolved.   01/11/2005--initial diagnosis and treatment of hypertension. Patient has been poorly compliant.    Chronic hoarseness 06/19/2016 07/31/2015--patient presents with a 4-5 month history of chronic hoarseness. This gets particularly worse at the end of the day to the point that he can barely talk. He has not noticed any swollen lymph nodes or masses in his neck. Suspected vocal cord polyps. ENT referral.    Diverticulosis of colon 06/14/2021 August 24, 2021--colonoscopy revealed multiple small and large mouth diverticuli in the sigmoid colon.  No bleeding.  Nonbleeding internal hemorrhoids.  Otherwise normal exam.  No specimens taken.     December 17, 2015--colonoscopy revealed diverticulosis in the rectosigmoid, sigmoid, and descending colon.  There was one 5 mm polyp in the transverse colon which was removed.  Pathology returned tub    Gynecomastia, male 06/19/2016 07/31/2015--patient has lost a tremendous amount of weight but has residual excess skin and breast tissue that he would like to have surgically corrected. Patient referred to plastic surgery.    History of bacterial pneumonia 06/19/2016 08/18/2006--patient presented with chest congestion, cough, fever. Chest x-ray revealed consolidation in the right lower lobe without pleural effusion. Patient was treated with Augmentin and I added a Z-Johnson which resulted in resolution.    History of colon polyps, 8/24/2021--negative colonoscopy.  12/17/2015--tubular adenoma x1 06/14/2021 August 24, 2021--colonoscopy revealed multiple small and large mouth diverticuli in the sigmoid colon.  No bleeding.  Nonbleeding internal hemorrhoids.  Otherwise normal exam.  No specimens taken.     December 17, 2015--colonoscopy revealed diverticulosis in the rectosigmoid, sigmoid, and descending colon.  There was one 5 mm polyp in the transverse colon which was  removed.  Pathology returned tub    History of Fracture of right cuboid bone 06/19/2016 12/24/2002--patient presented to the emergency room with right foot pain and swelling. X-ray revealed a nondisplaced cuboid fracture which was treated conservatively.    History of gastric surgery for morbid obesity bastric bypass 06/19/2016 01/26/2005--laparoscopic Vitaliy-en-Y divided gastric bypass.    History of gout 06/19/2016 03/02/2005--initial diagnosis and treatment of acute gouty arthritis.    History of Lumbar burst fracture 06/19/2016 11/21/2001--patient fell off a ladder while at work and landed on his buttocks and bilateral heels. He suffered an L1 burst fracture and a right wrist fracture. There was very minimal cord impingement. Treated conservatively with an oyster shell brace. Subsequent follow-up x-ray revealed healed fracture.    History of Periorbital cellulitis 06/19/2016 07/05/2006--patient presented with right periorbital redness and swelling and chalazion. This revealed preseptal cellulitis with no evidence of abscess. Patient admitted the hospital and responded to IV antibiotics but also required IV steroids due to severe periocular inflammation. He responded well to this. He was reevaluated by the ophthalmologist 07/14/2006 and his vision was 20/20 and his lid    Impaired fasting glucose 06/14/2021    10/14/2021--fasting serum glucose 103.  Strongly recommend low carbohydrate diet, exercise, and weight loss.    Internal hemorrhoids 08/24/2021    Non morbid obesity 06/19/2016 01/18/2005--patient was evaluated for minimally invasive bariatric surgery and this was subsequently performed. I do not have the details at the present time.    Obstructive sleep apnea 06/19/2016 07/31/2015--patient reestablished. He has lost a significant amount of weight and has not used CPAP for years. He reports he sleeps well and has no hypersomnolence. Overnight oximetry ordered.   12/10/2002--split night  sleep study with diagnostic CPAP titration. Total respiratory disturbance index 54.0 per sleep hour with oxygen desaturations to 67%. Improvement on CPAP at a pressure of 13, with a    Vitamin D deficiency 06/02/2021    Vocal cord polyp 06/19/2016 08/11/2015--patient underwent a microlaryngoscopy with excision of vocal cord polyp. This was performed for chronic hoarseness. Pathology returned hyperplasia of squamous epithelium. No atypia detected.         Past Surgical History:   Procedure Laterality Date    COLONOSCOPY  02/12/2002 02/12/2002--colonoscopy performed for bright red blood per rectum was a normal colonoscopy to the cecum with a good prep.    COLONOSCOPY N/A 8/24/2021    Procedure: COLONOSCOPY TO CECUM;  Surgeon: Lei Freeman MD;  Location: [facility] ENDOSCOPY;  Service: General;  Laterality: N/A;  HISTORY OF COLON POLYPS  DIVERTICULOSIS, INTERNAL HEMORRHOIDS    COLONOSCOPY W/ POLYPECTOMY  12/17/2015 December 17, 2015--colonoscopy revealed diverticulosis in the rectosigmoid, sigmoid, and descending colon.  There was one 5 mm polyp in the transverse colon which was removed.  Pathology returned tubular adenoma.    LARYNGOSCOPY  08/11/2015 08/11/2015--patient underwent a microlaryngoscopy with excision of vocal cord polyp. This was performed for chronic hoarseness. Pathology returned hyperplasia of squamous epithelium. No atypia detected.    VITALIY-EN-Y PROCEDURE  01/26/2005 01/26/2005--laparoscopic Vitaliy-en-Y divided gastric bypass.    TONSILLECTOMY      7 years of age.         No Known Allergies        Current Outpatient Medications:     allopurinol (ZYLOPRIM) 300 MG tablet, TAKE 1 TABLET BY MOUTH DAILY FOR GOUT PREVENTION, Disp: 90 tablet, Rfl: 3    vitamin D3 125 MCG (5000 UT) capsule capsule, 1 by mouth daily as directed for low vitamin D, Disp: 30 capsule, Rfl:     Semaglutide-Weight Management 1 MG/0.5ML solution auto-injector, Inject 0.5 mL under the skin into the appropriate  "area as directed 1 (One) Time Per Week. For diabetes and obesity, Disp: 2 mL, Rfl: 3      Family History   Problem Relation Age of Onset    Hypertension Mother         Essential    Diabetes Mother     Hyperlipidemia Mother     Atrial fibrillation Father     Hypertension Father         Essential    Gout Father     Hyperlipidemia Father     Diabetes Father         Type 2         Social History     Socioeconomic History    Marital status:    Tobacco Use    Smoking status: Former     Current packs/day: 0.00     Types: Cigarettes     Quit date:      Years since quittin.9    Smokeless tobacco: Never   Vaping Use    Vaping status: Never Used   Substance and Sexual Activity    Alcohol use: Yes     Alcohol/week: 7.0 standard drinks of alcohol     Types: 7 Cans of beer per week     Comment: Occasionally    Drug use: Never    Sexual activity: Yes     Partners: Female         Vitals:    24 0920   BP: 138/88   Pulse: 78   Resp: 16   Temp: 98.7 °F (37.1 °C)   TempSrc: Oral   SpO2: 96%   Weight: 115 kg (253 lb)   Height: 177.8 cm (70\")        Body mass index is 36.3 kg/m².      Physical Exam:    General: Alert and oriented x 3.  No acute distress.  Normal affect.  HEENT: Pupils equal, round, reactive to light; extraocular movements intact; sclerae nonicteric; pharynx, ear canals and TMs normal.  Neck: Without JVD, thyromegaly, bruit, or adenopathy.  Lungs: Clear to auscultation in all fields.  Heart: Regular rate and rhythm without murmur, rub, gallop, or click.  Abdomen: Soft, nontender, without hepatosplenomegaly or hernia.  Bowel sounds normal.  : Deferred.  Rectal: Deferred.  Extremities: Without clubbing, cyanosis, edema, or pulse deficit.  Neurologic: Intact without focal deficit.  Normal station and gait observed during ingress and egress from the examination room.  Skin: Without significant lesion.  Musculoskeletal: Unremarkable.    Lab/other results:      Assessment/Plan:     Diagnosis Plan   1. " Non morbid obesity  Semaglutide-Weight Management 1 MG/0.5ML solution auto-injector      2. Type 2 diabetes mellitus without complication, without long-term current use of insulin  Semaglutide-Weight Management 1 MG/0.5ML solution auto-injector      3. Benign essential hypertension          Patient has Non morbid obesity and has not had much of a response on the low doses of Wegovy.  He not having any side effects and we can increase the dose.  His weight is only up a little bit which is fairly good considering the fact there is been a lot of [place] holiday functions.  His type 2 diabetes is under good control with the medication however.  His blood pressure remains elevated but I do not think it is bad enough to warrant starting medication at this time.  I think that it will resolve with weight loss.    Plan is as follows: Will increase the Wegovy to 1 mg subcutaneously weekly.  Prescription sent.  Will take a prior authorization.  I do not think that is going to be an issue.  Patient will follow-up in March to reassess the situation.  If after a month of being on the 1 mg dose he wants to increase it that he should contact me and we can send in a new prescription.        Procedures

## (undated) DEVICE — KT ORCA ORCAPOD DISP STRL

## (undated) DEVICE — TUBING, SUCTION, 1/4" X 10', STRAIGHT: Brand: MEDLINE

## (undated) DEVICE — CANN O2 ETCO2 FITS ALL CONN CO2 SMPL A/ 7IN DISP LF

## (undated) DEVICE — SENSR O2 OXIMAX FNGR A/ 18IN NONSTR

## (undated) DEVICE — LN SMPL CO2 SHTRM SD STREAM W/M LUER

## (undated) DEVICE — ADAPT CLN BIOGUARD AIR/H2O DISP